# Patient Record
Sex: FEMALE | Race: WHITE | NOT HISPANIC OR LATINO | Employment: FULL TIME | URBAN - METROPOLITAN AREA
[De-identification: names, ages, dates, MRNs, and addresses within clinical notes are randomized per-mention and may not be internally consistent; named-entity substitution may affect disease eponyms.]

---

## 2017-02-03 ENCOUNTER — TRANSCRIBE ORDERS (OUTPATIENT)
Dept: ADMINISTRATIVE | Facility: HOSPITAL | Age: 42
End: 2017-02-03

## 2017-02-03 DIAGNOSIS — Z12.31 VISIT FOR SCREENING MAMMOGRAM: Primary | ICD-10-CM

## 2017-02-28 ENCOUNTER — HOSPITAL ENCOUNTER (OUTPATIENT)
Dept: MAMMOGRAPHY | Facility: HOSPITAL | Age: 42
Discharge: HOME | End: 2017-02-28
Attending: OBSTETRICS & GYNECOLOGY | Admitting: OBSTETRICS & GYNECOLOGY

## 2017-02-28 DIAGNOSIS — Z12.31 VISIT FOR SCREENING MAMMOGRAM: ICD-10-CM

## 2017-02-28 PROCEDURE — 77063 BREAST TOMOSYNTHESIS BI: CPT

## 2017-02-28 PROCEDURE — 77067 SCR MAMMO BI INCL CAD: CPT | Performed by: RADIOLOGY

## 2017-02-28 PROCEDURE — G0202 SCR MAMMO BI INCL CAD: HCPCS

## 2017-02-28 PROCEDURE — 77063 BREAST TOMOSYNTHESIS BI: CPT | Performed by: RADIOLOGY

## 2018-01-02 ENCOUNTER — TRANSCRIBE ORDERS (OUTPATIENT)
Dept: ADMINISTRATIVE | Facility: HOSPITAL | Age: 43
End: 2018-01-02

## 2018-01-02 ENCOUNTER — HOSPITAL ENCOUNTER (OUTPATIENT)
Dept: GENERAL RADIOLOGY | Facility: HOSPITAL | Age: 43
Discharge: HOME | End: 2018-01-02
Attending: INTERNAL MEDICINE | Admitting: INTERNAL MEDICINE

## 2018-01-02 DIAGNOSIS — M54.5 LOW BACK PAIN, UNSPECIFIED BACK PAIN LATERALITY, UNSPECIFIED CHRONICITY, WITH SCIATICA PRESENCE UNSPECIFIED: Primary | ICD-10-CM

## 2018-01-02 DIAGNOSIS — R07.81 RIB PAIN ON RIGHT SIDE: ICD-10-CM

## 2018-01-02 PROCEDURE — 71046 X-RAY EXAM CHEST 2 VIEWS: CPT

## 2018-01-02 PROCEDURE — 72110 X-RAY EXAM L-2 SPINE 4/>VWS: CPT

## 2018-01-17 ENCOUNTER — TRANSCRIBE ORDERS (OUTPATIENT)
Dept: ADMINISTRATIVE | Facility: HOSPITAL | Age: 43
End: 2018-01-17

## 2018-01-17 DIAGNOSIS — G06.1 INTRASPINAL ABSCESS: Primary | ICD-10-CM

## 2018-01-26 ENCOUNTER — HOSPITAL ENCOUNTER (OUTPATIENT)
Dept: CT IMAGING | Facility: HOSPITAL | Age: 43
Discharge: HOME | End: 2018-01-26
Attending: INTERNAL MEDICINE | Admitting: INTERNAL MEDICINE

## 2018-01-26 DIAGNOSIS — G06.1 INTRASPINAL ABSCESS: ICD-10-CM

## 2018-01-26 PROCEDURE — 71250 CT THORAX DX C-: CPT

## 2018-03-22 ENCOUNTER — OFFICE VISIT (OUTPATIENT)
Dept: FAMILY MEDICINE CLINIC | Facility: CLINIC | Age: 43
End: 2018-03-22

## 2018-03-22 VITALS
SYSTOLIC BLOOD PRESSURE: 122 MMHG | TEMPERATURE: 98.1 F | OXYGEN SATURATION: 98 % | DIASTOLIC BLOOD PRESSURE: 76 MMHG | WEIGHT: 124 LBS | BODY MASS INDEX: 21.17 KG/M2 | HEIGHT: 64 IN | HEART RATE: 81 BPM

## 2018-03-22 DIAGNOSIS — M54.6 THORACIC SPINE PAIN: ICD-10-CM

## 2018-03-22 DIAGNOSIS — Q34.9 THORACIC CYST: Primary | ICD-10-CM

## 2018-03-22 PROCEDURE — 99203 OFFICE O/P NEW LOW 30 MIN: CPT | Performed by: NURSE PRACTITIONER

## 2018-03-22 RX ORDER — MULTIVIT-MIN/IRON FUM/FOLIC AC 7.5 MG-4
1 TABLET ORAL DAILY
COMMUNITY

## 2018-03-22 RX ORDER — AZELASTINE HCL 205.5 UG/1
SPRAY NASAL
COMMUNITY
Start: 2018-03-05 | End: 2018-06-04

## 2018-03-22 NOTE — PROGRESS NOTES
"Subjective   Tamara Richardson is a 42 y.o. female here to establish care.  Chief Complaint   Patient presents with   • Establish Care     Previous PCP Dr Mar   • Mass     on back,  low pain       History of Present Illness   Patient is here to establish care, has been a patient of Dr. Mar, is here with complaint of \"knot on back\", patient and her  also noted a more prominent bony area over right upper upper ribs over past few months, had CT which did not show chest abnormality, but incidentally noted a cyst at T3 that was benign appearing, patient is very concerned that she might have cancer because she is finding new \"lumps\" and wants to have testing. Labs were done with Dr. Mar that patient states were normal. Denies any notable weight loss that would have caused nodules to be more prominent.  The following portions of the patient's history were reviewed and updated as appropriate: allergies, current medications, past family history, past medical history, past social history, past surgical history and problem list.    Review of Systems   Constitutional: Positive for fatigue. Negative for appetite change, chills, fever and unexpected weight change.   Eyes: Negative for pain, discharge and visual disturbance.   Respiratory: Negative for cough, shortness of breath and wheezing.    Cardiovascular: Negative for chest pain, palpitations and leg swelling.   Gastrointestinal: Negative for abdominal pain, blood in stool, constipation, diarrhea, nausea and vomiting.   Endocrine: Negative for polydipsia and polyuria.   Genitourinary: Negative for difficulty urinating, dysuria and menstrual problem.   Musculoskeletal: Positive for back pain (thoracic and lumbar spine).   Neurological: Negative for dizziness, light-headedness and headaches.   Psychiatric/Behavioral: Negative for dysphoric mood and sleep disturbance. The patient is not nervous/anxious.      Blood pressure 122/76, pulse 81, temperature 98.1 °F (36.7 " "°C), temperature source Temporal Artery , height 162.6 cm (64\"), weight 56.2 kg (124 lb), last menstrual period 03/18/2018, SpO2 98 %, not currently breastfeeding.    No Known Allergies  History reviewed. No pertinent past medical history.  Past Surgical History:   Procedure Laterality Date   • AUGMENTATION MAMMAPLASTY     • WISDOM TOOTH EXTRACTION       Family History   Problem Relation Age of Onset   • Atrial fibrillation Mother    • Hyperlipidemia Mother    • Cancer Father    • Hyperlipidemia Brother    • Allergy (severe) Daughter    • Breast cancer Neg Hx    • Ovarian cancer Neg Hx      Social History     Social History   • Marital status:      Spouse name: N/A   • Number of children: N/A   • Years of education: N/A     Occupational History   • Not on file.     Social History Main Topics   • Smoking status: Never Smoker   • Smokeless tobacco: Never Used   • Alcohol use Yes      Comment: social   • Drug use: No   • Sexual activity: Yes     Partners: Male     Other Topics Concern   • Not on file     Social History Narrative   • No narrative on file       There is no immunization history on file for this patient.    Current Outpatient Prescriptions:   •  Multiple Vitamins-Minerals (MULTIVITAMIN WITH MINERALS) tablet, Take 1 tablet by mouth Daily., Disp: , Rfl:   •  azelastine (ASTEPRO) 0.15 % solution nasal spray, , Disp: , Rfl:     Objective   Physical Exam   Constitutional: She is oriented to person, place, and time. She appears well-developed and well-nourished. No distress.   HENT:   Head: Normocephalic and atraumatic.   Right Ear: External ear normal.   Left Ear: External ear normal.   Mouth/Throat: Oropharynx is clear and moist. No oropharyngeal exudate.   Eyes: Conjunctivae and EOM are normal. Pupils are equal, round, and reactive to light. No scleral icterus.   Neck: Normal range of motion. Neck supple.   Cardiovascular: Normal rate, regular rhythm and normal heart sounds.    No murmur " heard.  Pulmonary/Chest: Effort normal and breath sounds normal. No respiratory distress. She has no wheezes. She has no rales.   Musculoskeletal:   2.5 cm round slightly mobile cyst-like lesion at upper thoracic spine and just slightly to left of spine, tender to palpation    Neurological: She is alert and oriented to person, place, and time.   Skin: Skin is warm and dry. She is not diaphoretic.   Psychiatric: She has a normal mood and affect. Her behavior is normal. Judgment and thought content normal.   Patient did become tearful a couple of times because of concern for cancer.   Vitals reviewed.      Assessment/Plan   Tamara was seen today for establish care and mass.    Diagnoses and all orders for this visit:    Thoracic cyst  -     MRI Thoracic Spine With & Without Contrast; Future    Thoracic spine pain  -     MRI Thoracic Spine With & Without Contrast; Future      New Medications Ordered This Visit   Medications   • azelastine (ASTEPRO) 0.15 % solution nasal spray   • Multiple Vitamins-Minerals (MULTIVITAMIN WITH MINERALS) tablet     Sig: Take 1 tablet by mouth Daily.       An MRI was ordered to evaluate the cyst noted on CT and that patient feels has become more prominent and now slightly painful or tender. Other options include US of lesion, referral for biopsy if indicated.   I tried to reassure patient, therapeutic listening used.  Patient was encouraged to keep me informed of any acute changes, lack of improvement, or any new concerning symptoms.Patient voiced understanding of all instructions and denied further questions.

## 2018-03-28 ENCOUNTER — HOSPITAL ENCOUNTER (OUTPATIENT)
Dept: MRI IMAGING | Facility: HOSPITAL | Age: 43
Discharge: HOME | End: 2018-03-28
Admitting: NURSE PRACTITIONER

## 2018-03-28 DIAGNOSIS — M54.6 THORACIC SPINE PAIN: ICD-10-CM

## 2018-03-28 DIAGNOSIS — Q34.9 THORACIC CYST: ICD-10-CM

## 2018-03-28 PROCEDURE — 0 GADOBENATE DIMEGLUMINE 529 MG/ML SOLUTION: Performed by: NURSE PRACTITIONER

## 2018-03-28 PROCEDURE — 72157 MRI CHEST SPINE W/O & W/DYE: CPT

## 2018-03-28 PROCEDURE — A9577 INJ MULTIHANCE: HCPCS | Performed by: NURSE PRACTITIONER

## 2018-03-28 RX ADMIN — GADOBENATE DIMEGLUMINE 10 ML: 529 INJECTION, SOLUTION INTRAVENOUS at 10:30

## 2018-05-14 ENCOUNTER — TRANSCRIBE ORDERS (OUTPATIENT)
Dept: ADMINISTRATIVE | Facility: HOSPITAL | Age: 43
End: 2018-05-14

## 2018-05-14 DIAGNOSIS — Z12.31 VISIT FOR SCREENING MAMMOGRAM: Primary | ICD-10-CM

## 2018-06-04 ENCOUNTER — OFFICE VISIT (OUTPATIENT)
Dept: FAMILY MEDICINE CLINIC | Facility: CLINIC | Age: 43
End: 2018-06-04

## 2018-06-04 VITALS
SYSTOLIC BLOOD PRESSURE: 120 MMHG | OXYGEN SATURATION: 98 % | HEART RATE: 82 BPM | BODY MASS INDEX: 21.34 KG/M2 | HEIGHT: 64 IN | DIASTOLIC BLOOD PRESSURE: 70 MMHG | WEIGHT: 125 LBS

## 2018-06-04 DIAGNOSIS — M89.8X9 BONY PROMINENCE: ICD-10-CM

## 2018-06-04 DIAGNOSIS — L72.0 INCLUSION CYST: Primary | ICD-10-CM

## 2018-06-04 PROCEDURE — 99213 OFFICE O/P EST LOW 20 MIN: CPT | Performed by: INTERNAL MEDICINE

## 2018-06-04 NOTE — PROGRESS NOTES
Pleasant 42F new to me here to est care. Seen by Jeanie Tracy NP  3/22/18 and previously a patient of Dr. Mar.  No records received to date.    Wants reassurance on following:  3yrs ago noted prominence at left corner of jaw in front of ear - seen by Dr. Mar and Dr. Ga and told lymph node vs bone and nothing concerning.  U/s done and negative.  No changes over 3yrs, just feels a bit more prominent without pain.    In last year noted prominence of right anterior rib without pain or trauma - had xray done and then CT chest done - nothing significant at this site (rib 3 on right).  Noted inclusion cyst at T3 that was benign appearing, some mild arthritic changes thoracic spine.    In last several months, noted lump at Tspine.  Nontender.  Not growing.  Seen by NP 3/22/18 - described as superficial 2.5cm cyst that was movable.  MRI of tspine done and confirmed cyst at this level.    Today, she states she feels well and has not had any changes to issues above.  Just wanted reassurance.  Denies f/c, malaise, sweats, unwanted wt loss.    Comprehensive ros otherwise negative.    There are no active problems to display for this patient.    Past Surgical History:   Procedure Laterality Date   • AUGMENTATION MAMMAPLASTY      2010   • WISDOM TOOTH EXTRACTION       Current Outpatient Prescriptions   Medication Sig Dispense Refill   • Multiple Vitamins-Minerals (MULTIVITAMIN WITH MINERALS) tablet Take 1 tablet by mouth Daily.       No current facility-administered medications for this visit.      No Known Allergies    Social History     Social History   • Marital status:      Spouse name: Eugenio   • Number of children: 2     Occupational History   • School Psychologist      [employer]     Social History Main Topics   • Smoking status: Never Smoker   • Smokeless tobacco: Never Used   • Alcohol use Yes      Comment: social   • Drug use: No   • Sexual activity: Yes     Partners: Male     Other Topics Concern  "  • Not on file     Social History Narrative    6/17:     to Eugenio.    2 girls:  11,13    Work: school psychologist    Exercise: yes     Family History   Problem Relation Age of Onset   • Atrial fibrillation Mother    • Hyperlipidemia Mother    • Cancer Father         kidney   • Hyperlipidemia Brother    • Allergy (severe) Daughter         treenut   • Breast cancer Neg Hx    • Ovarian cancer Neg Hx        /70   Pulse 82   Ht 162.6 cm (64.02\")   Wt 56.7 kg (125 lb)   SpO2 98%   BMI 21.45 kg/m²   Gen: well appearing in nad, no resp effort  Eyes: conjunctiva clear, perrl, eomi  ENT: mmm, no thyromegaly, no lymphadenopathy  CV: s1, s2 reg no m/r/g, no bruits, no jvd  No peripheral edema, pedal pulses intact  Resp:  clear b/l no w/r/r  GI:  soft nt/nd  Skin: no clubbing or cyanosis  Neuro: no focal deficits.  MS: tspine - t3 level with 2.5cm superficial lump c/w cyst that is movable and nontender.    Chest: prominent right rib at 3rd rib costochondral junction, nontender  Left jaw - angle of jaw more prominent than right, no definite LN, nontender  No cervical or supraclavicular lymphad    All imaging reviewed in epic - ls spine xray, ct chest, MRI thoracic spine  Med recs requested and will be reviewed    A/P    1. Inclusion cyst    2. Bony prominence        Inclusion cyst of tspine - unchanged c/w exam 3mo prior - pt reassured - advised no intervention recommended at this time - f/u 10/18 cpe , will reassess then    Bony prominence of right 3rd rib at costochondral jx  - pt reassured    F/u as needed      "

## 2018-06-19 ENCOUNTER — HOSPITAL ENCOUNTER (OUTPATIENT)
Dept: MAMMOGRAPHY | Facility: HOSPITAL | Age: 43
Discharge: HOME | End: 2018-06-19
Attending: OBSTETRICS & GYNECOLOGY | Admitting: OBSTETRICS & GYNECOLOGY

## 2018-06-19 DIAGNOSIS — Z12.31 VISIT FOR SCREENING MAMMOGRAM: ICD-10-CM

## 2018-06-19 PROCEDURE — 77067 SCR MAMMO BI INCL CAD: CPT | Performed by: RADIOLOGY

## 2018-06-19 PROCEDURE — 77067 SCR MAMMO BI INCL CAD: CPT

## 2018-06-19 PROCEDURE — 77063 BREAST TOMOSYNTHESIS BI: CPT

## 2018-06-19 PROCEDURE — 77063 BREAST TOMOSYNTHESIS BI: CPT | Performed by: RADIOLOGY

## 2018-07-02 ENCOUNTER — TRANSCRIBE ORDERS (OUTPATIENT)
Dept: MAMMOGRAPHY | Facility: HOSPITAL | Age: 43
End: 2018-07-02

## 2018-07-02 ENCOUNTER — HOSPITAL ENCOUNTER (OUTPATIENT)
Dept: MAMMOGRAPHY | Facility: HOSPITAL | Age: 43
Discharge: HOME | End: 2018-07-02
Admitting: OBSTETRICS & GYNECOLOGY

## 2018-07-02 ENCOUNTER — HOSPITAL ENCOUNTER (OUTPATIENT)
Dept: ULTRASOUND IMAGING | Facility: HOSPITAL | Age: 43
Discharge: HOME | End: 2018-07-02

## 2018-07-02 DIAGNOSIS — R92.8 ABNORMAL MAMMOGRAM: ICD-10-CM

## 2018-07-02 DIAGNOSIS — R92.8 ABNORMAL MAMMOGRAM: Primary | ICD-10-CM

## 2018-07-02 PROCEDURE — 77065 DX MAMMO INCL CAD UNI: CPT | Performed by: RADIOLOGY

## 2018-07-02 PROCEDURE — 77061 BREAST TOMOSYNTHESIS UNI: CPT | Performed by: RADIOLOGY

## 2018-07-02 PROCEDURE — G0279 TOMOSYNTHESIS, MAMMO: HCPCS

## 2018-07-02 PROCEDURE — 77065 DX MAMMO INCL CAD UNI: CPT

## 2018-07-02 PROCEDURE — 76642 ULTRASOUND BREAST LIMITED: CPT | Performed by: RADIOLOGY

## 2018-07-02 PROCEDURE — 76642 ULTRASOUND BREAST LIMITED: CPT

## 2018-10-18 ENCOUNTER — OFFICE VISIT (OUTPATIENT)
Dept: FAMILY MEDICINE CLINIC | Facility: CLINIC | Age: 43
End: 2018-10-18

## 2018-10-18 VITALS
BODY MASS INDEX: 20.83 KG/M2 | WEIGHT: 122 LBS | SYSTOLIC BLOOD PRESSURE: 110 MMHG | OXYGEN SATURATION: 97 % | HEART RATE: 77 BPM | HEIGHT: 64 IN | DIASTOLIC BLOOD PRESSURE: 64 MMHG

## 2018-10-18 DIAGNOSIS — Z00.00 ANNUAL PHYSICAL EXAM: Primary | ICD-10-CM

## 2018-10-18 PROBLEM — T78.40XA ALLERGY: Status: ACTIVE | Noted: 2018-10-18

## 2018-10-18 LAB
ALBUMIN SERPL-MCNC: 4.8 G/DL (ref 3.2–4.8)
ALBUMIN/GLOB SERPL: 2 G/DL (ref 1.5–2.5)
ALP SERPL-CCNC: 76 U/L (ref 25–100)
ALT SERPL W P-5'-P-CCNC: 17 U/L (ref 7–40)
ANION GAP SERPL CALCULATED.3IONS-SCNC: 4 MMOL/L (ref 3–11)
ARTICHOKE IGE QN: 102 MG/DL (ref 0–130)
AST SERPL-CCNC: 20 U/L (ref 0–33)
BASOPHILS # BLD AUTO: 0.01 10*3/MM3 (ref 0–0.2)
BASOPHILS NFR BLD AUTO: 0.2 % (ref 0–1)
BILIRUB SERPL-MCNC: 0.8 MG/DL (ref 0.3–1.2)
BUN BLD-MCNC: 7 MG/DL (ref 9–23)
BUN/CREAT SERPL: 12.5 (ref 7–25)
CALCIUM SPEC-SCNC: 9.7 MG/DL (ref 8.7–10.4)
CHLORIDE SERPL-SCNC: 104 MMOL/L (ref 99–109)
CHOLEST SERPL-MCNC: 217 MG/DL (ref 0–200)
CO2 SERPL-SCNC: 28 MMOL/L (ref 20–31)
CREAT BLD-MCNC: 0.56 MG/DL (ref 0.6–1.3)
DEPRECATED RDW RBC AUTO: 43.5 FL (ref 37–54)
EOSINOPHIL # BLD AUTO: 0.08 10*3/MM3 (ref 0–0.3)
EOSINOPHIL NFR BLD AUTO: 1.6 % (ref 0–3)
ERYTHROCYTE [DISTWIDTH] IN BLOOD BY AUTOMATED COUNT: 13.3 % (ref 11.3–14.5)
GFR SERPL CREATININE-BSD FRML MDRD: 118 ML/MIN/1.73
GLOBULIN UR ELPH-MCNC: 2.4 GM/DL
GLUCOSE BLD-MCNC: 89 MG/DL (ref 70–100)
HCT VFR BLD AUTO: 41.6 % (ref 34.5–44)
HDLC SERPL-MCNC: 108 MG/DL (ref 40–60)
HGB BLD-MCNC: 13.6 G/DL (ref 11.5–15.5)
IMM GRANULOCYTES # BLD: 0.01 10*3/MM3 (ref 0–0.03)
IMM GRANULOCYTES NFR BLD: 0.2 % (ref 0–0.6)
LYMPHOCYTES # BLD AUTO: 1.9 10*3/MM3 (ref 0.6–4.8)
LYMPHOCYTES NFR BLD AUTO: 37.2 % (ref 24–44)
MCH RBC QN AUTO: 29.2 PG (ref 27–31)
MCHC RBC AUTO-ENTMCNC: 32.7 G/DL (ref 32–36)
MCV RBC AUTO: 89.5 FL (ref 80–99)
MONOCYTES # BLD AUTO: 0.45 10*3/MM3 (ref 0–1)
MONOCYTES NFR BLD AUTO: 8.8 % (ref 0–12)
NEUTROPHILS # BLD AUTO: 2.66 10*3/MM3 (ref 1.5–8.3)
NEUTROPHILS NFR BLD AUTO: 52 % (ref 41–71)
PLATELET # BLD AUTO: 320 10*3/MM3 (ref 150–450)
PMV BLD AUTO: 10.4 FL (ref 6–12)
POTASSIUM BLD-SCNC: 4.1 MMOL/L (ref 3.5–5.5)
PROT SERPL-MCNC: 7.2 G/DL (ref 5.7–8.2)
RBC # BLD AUTO: 4.65 10*6/MM3 (ref 3.89–5.14)
SODIUM BLD-SCNC: 136 MMOL/L (ref 132–146)
TRIGL SERPL-MCNC: 61 MG/DL (ref 0–150)
TSH SERPL DL<=0.05 MIU/L-ACNC: 0.95 MIU/ML (ref 0.35–5.35)
WBC NRBC COR # BLD: 5.11 10*3/MM3 (ref 3.5–10.8)

## 2018-10-18 PROCEDURE — 80061 LIPID PANEL: CPT | Performed by: INTERNAL MEDICINE

## 2018-10-18 PROCEDURE — 99396 PREV VISIT EST AGE 40-64: CPT | Performed by: INTERNAL MEDICINE

## 2018-10-18 PROCEDURE — 80053 COMPREHEN METABOLIC PANEL: CPT | Performed by: INTERNAL MEDICINE

## 2018-10-18 PROCEDURE — 84443 ASSAY THYROID STIM HORMONE: CPT | Performed by: INTERNAL MEDICINE

## 2018-10-18 PROCEDURE — 36415 COLL VENOUS BLD VENIPUNCTURE: CPT | Performed by: INTERNAL MEDICINE

## 2018-10-18 PROCEDURE — 85025 COMPLETE CBC W/AUTO DIFF WBC: CPT | Performed by: INTERNAL MEDICINE

## 2018-10-18 NOTE — PROGRESS NOTES
Reason for Visit   This is a 43 yr old patient who presents for a complete physical exam.  Chief Complaint   Patient offers no new complaints.     History of Present Illness   Here for CPE.    ý   Review of Systems      General: no fever, chills, weight loss, or fatigue  Eyes: no blurry vision or change in vision  ENT: no change in hearing, difficulty hearing, rhinorrhea or nasal discharge  CV: no cp, sob, palpitations; no PND, orthopnea, le edema; no ledesma   Respiratory: no cough, wheezes, sob  GI: no change in bowel habits, no n/v/d/c, no melena, no blood in stool   : no frequency, no urgency, no dysuria, no vaginal discharge, no pelvic pain  MS: no joint pains, back pain, or myalgias  Neuro: no headache, dizziness, or weakness; no new parenthesis.  Endocrine: no polyuria, polydipsia or polyphagia; no heat or cold intolerance  Heme: no easy bruising or bleeding   Skin: no rashes or abnormal moles     Remainder of ROS reviewed in detail and found to be negative and noncontributory.     OBGYN History    Menses regular  LMP: ?  Sexually Active: Y  Contraception:   Abnormal pap smear:  No history of abnormal pap, est with gyn, utd  STD: no history of STD      Patient Active Problem List   Diagnosis   • Allergy       Past Surgical History:   Procedure Laterality Date   • AUGMENTATION MAMMAPLASTY         • WISDOM TOOTH EXTRACTION         Current Outpatient Prescriptions   Medication Sig Dispense Refill   • Loratadine (CLARITIN PO) Take  by mouth.     • Multiple Vitamins-Minerals (MULTIVITAMIN WITH MINERALS) tablet Take 1 tablet by mouth Daily.       No current facility-administered medications for this visit.        No Known Allergies    Family History   Family History   Problem Relation Age of Onset   • Atrial fibrillation Mother    • Hyperlipidemia Mother    • Cancer Father         kidney   • Hyperlipidemia Brother    • Allergy (severe) Daughter         treenut   • Breast cancer Neg Hx    • Ovarian cancer Neg  "Hx        Social History   Social History     Social History   • Marital status:      Spouse name: Eugenio   • Number of children: 2     Occupational History   • School Psychologist      [employer]     Social History Main Topics   • Smoking status: Never Smoker   • Smokeless tobacco: Never Used   • Alcohol use Yes      Comment: social   • Drug use: No   • Sexual activity: Yes     Partners: Male     Other Topics Concern   • Not on file     Social History Narrative    6/17:     to Eugenio.    2 girls:  11,13    Work: school psychologist    Exercise: yes        10/18:    No changes.    Eye: no corrective lenses - h/o lasik    Dental: utd                  There is no immunization history on file for this patient.    Health Maintenance  Mammo 7/18 utd  Pap 2018 per pt, utd    Blood pressure 110/64, pulse 77, height 162.6 cm (64.02\"), weight 55.3 kg (122 lb), SpO2 97 %, not currently breastfeeding.  Body mass index is 20.93 kg/m².  1    10/18/18  0825   Weight: 55.3 kg (122 lb)         Physical Exam   Gen: Pleasant, NAD  HEENT: perrl, eomi, tm wnl b/l, canals clear, conjuntiva clear b/l, mmm, op clear  Neck: supple, no lad or thyromegaly/nodules, no bruit , no jvd  Pulm: cta b/l, no w/r/r  CV: S1, S2 reg, no mrg, no s3/s4  Abd: soft, nt, nd, + bs, no hsm  Ext: no c/c/e, distal pulses intact  Neuro: AAO x 3, no focal motor or sensory deficits, normal gait   Skin: no rashes, no suspicious nevi   Superficial cyst of upper back lying over aera around t1-2 - 1.2 x 1.5 cm movable    Assessment and Plan    43F for CPE  Discussion and Plan:  General health screening appropriate for age reviewed  General nutrition recommendations reviewed  Calcium with Vitamin D recommendations reviewed -  Exercise recommendations reviewed  Healthy weight guidelines reviewed  Yearly breast exam recommended  Self breast exam reviewed and recommended monthly  Mammography discussed, pt utd  Annual physical exam recommended  Labs " ordered today  Imm - tdap, flu shots due, pt could probably benefit from hep a series as well     Diagnosis Plan   1. Annual physical exam  Lipid panel    TSH    Comprehensive metabolic panel    CBC w AUTO Differential     x

## 2018-11-02 ENCOUNTER — TELEPHONE (OUTPATIENT)
Dept: FAMILY MEDICINE CLINIC | Facility: CLINIC | Age: 43
End: 2018-11-02

## 2018-11-02 NOTE — TELEPHONE ENCOUNTER
PT CALLED ABOUT HER LABS AND HAD A QUESTION. PT SAID HER BUN LEVEL WAS LOW AND WANTS TO KNOW IF THAT OK.  PLEASE CALL PT BACK [phone] PT SAID IF SHE DOESN'T ANSWER TO JUST LET HER KNOW ON THE VOICEMAIL

## 2018-11-05 NOTE — TELEPHONE ENCOUNTER
LVM explaining to not be worried about the BUN level and may increase protein in diet. Gave office # for any further questions.

## 2018-11-05 NOTE — TELEPHONE ENCOUNTER
Not at all worried about low BUN level.  Advise she make sure she has enough protein in diet - sometimes will go low with lower protein diet.    Meghna

## 2019-01-07 ENCOUNTER — HOSPITAL ENCOUNTER (OUTPATIENT)
Dept: MAMMOGRAPHY | Facility: HOSPITAL | Age: 44
Discharge: HOME | End: 2019-01-07
Attending: OBSTETRICS & GYNECOLOGY | Admitting: OBSTETRICS & GYNECOLOGY

## 2019-01-07 DIAGNOSIS — R92.8 ABNORMAL MAMMOGRAM: ICD-10-CM

## 2019-01-07 PROCEDURE — 77065 DX MAMMO INCL CAD UNI: CPT | Performed by: RADIOLOGY

## 2019-01-07 PROCEDURE — 77061 BREAST TOMOSYNTHESIS UNI: CPT | Performed by: RADIOLOGY

## 2019-01-07 PROCEDURE — G0279 TOMOSYNTHESIS, MAMMO: HCPCS

## 2019-01-07 PROCEDURE — 77065 DX MAMMO INCL CAD UNI: CPT

## 2019-02-23 ENCOUNTER — OFFICE VISIT (OUTPATIENT)
Dept: FAMILY MEDICINE CLINIC | Facility: CLINIC | Age: 44
End: 2019-02-23

## 2019-02-23 VITALS
HEIGHT: 64 IN | DIASTOLIC BLOOD PRESSURE: 70 MMHG | WEIGHT: 130 LBS | SYSTOLIC BLOOD PRESSURE: 118 MMHG | BODY MASS INDEX: 22.2 KG/M2 | HEART RATE: 84 BPM | RESPIRATION RATE: 24 BRPM

## 2019-02-23 DIAGNOSIS — F41.9 ANXIETY: Primary | ICD-10-CM

## 2019-02-23 DIAGNOSIS — Z56.6 WORK-RELATED STRESS: ICD-10-CM

## 2019-02-23 PROCEDURE — 99213 OFFICE O/P EST LOW 20 MIN: CPT | Performed by: INTERNAL MEDICINE

## 2019-02-23 RX ORDER — ESCITALOPRAM OXALATE 10 MG/1
10 TABLET ORAL DAILY
Qty: 30 TABLET | Refills: 2 | Status: SHIPPED | OUTPATIENT
Start: 2019-02-23 | End: 2019-05-30 | Stop reason: SDUPTHER

## 2019-02-23 RX ORDER — ALPRAZOLAM 0.25 MG/1
0.25 TABLET ORAL NIGHTLY PRN
Qty: 15 TABLET | Refills: 0 | Status: SHIPPED | OUTPATIENT
Start: 2019-02-23 | End: 2019-11-09

## 2019-02-23 SDOH — HEALTH STABILITY - MENTAL HEALTH: OTHER PHYSICAL AND MENTAL STRAIN RELATED TO WORK: Z56.6

## 2019-02-23 NOTE — PROGRESS NOTES
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  She has decided to take profressional route and stick it out until the end of school year and is confident she can do this for another 12 weeks.  She has a very supportive and loving home environment with / family and [employer].  Her sleep is suffering and she is feeling queeziness at times related to stress.  Denies h/a, dizziness, cp, palpitations, sob, sweats, vomiting.  No depressed mood, SI.  She is here requesting some help with temporary med mgmt.    The following portions of the patient's history were reviewed and updated as appropriate: allergies, current medications, past family history, past medical history, past social history, past surgical history and problem list.      /70   Pulse 84   Resp 24   Ht 162.6 cm (64\")   Wt 59 kg (130 lb)   BMI 22.31 kg/m²   Gen: well appearing in nad, no resp effort  CV: s1, s2 reg no m/r/g, no bruits, no jvd  No peripheral edema, pedal pulses intact  Resp:  clear b/l no w/r/r  GI:  soft nt/nd  Neuro: no focal deficits.    A/P    1. Anxiety    2. Work-related stress      Pt with work related stress that she is overall managing quite well with good support system in place.  Discussed exercise, eating healthy, and certainly is important for her to sleep adequately.  Suggest: initiate lexapro 10mg daily with hopes it will help with all sx within 2-3wks time.  PRN xanax for sleep given .  Reviewed both meds, risks and benefits, side effect profiles.    Plan of care reviewed with patient at the conclusion of today's visit. Education was provided regarding diagnosis, management and any prescribed or recommended OTC medications.  Patient verbalizes understanding of and agreement with management plan.    "

## 2019-02-23 NOTE — PATIENT INSTRUCTIONS
Alprazolam tablets  What is this medicine?  ALPRAZOLAM (al PRAY lety medrano) is a benzodiazepine. It is used to treat anxiety and panic attacks.  This medicine may be used for other purposes; ask your health care provider or pharmacist if you have questions.  COMMON BRAND NAME(S): Xanax  What should I tell my health care provider before I take this medicine?  They need to know if you have any of these conditions:  -an alcohol or drug abuse problem  -bipolar disorder, depression, psychosis or other mental health conditions  -glaucoma  -kidney or liver disease  -lung or breathing disease  -myasthenia gravis  -Parkinson's disease  -porphyria  -seizures or a history of seizures  -suicidal thoughts  -an unusual or allergic reaction to alprazolam, other benzodiazepines, foods, dyes, or preservatives  -pregnant or trying to get pregnant  -breast-feeding  How should I use this medicine?  Take this medicine by mouth with a glass of water. Follow the directions on the prescription label. Take your medicine at regular intervals. Do not take it more often than directed. Do not stop taking except on your doctor's advice.  A special MedGuide will be given to you by the pharmacist with each prescription and refill. Be sure to read this information carefully each time.  Talk to your pediatrician regarding the use of this medicine in children. Special care may be needed.  Overdosage: If you think you have taken too much of this medicine contact a poison control center or emergency room at once.  NOTE: This medicine is only for you. Do not share this medicine with others.  What if I miss a dose?  If you miss a dose, take it as soon as you can. If it is almost time for your next dose, take only that dose. Do not take double or extra doses.  What may interact with this medicine?  Do not take this medicine with any of the following medications:  -certain antiviral medicines for HIV or AIDS like delavirdine, indinavir  -certain medicines for  fungal infections like ketoconazole and itraconazole  -narcotic medicines for cough  -sodium oxybate  This medicine may also interact with the following medications:  -alcohol  -antihistamines for allergy, cough and cold  -certain antibiotics like clarithromycin, erythromycin, isoniazid, rifampin, rifapentine, rifabutin, and troleandomycin  -certain medicines for blood pressure, heart disease, irregular heart beat  -certain medicines for depression, like amitriptyline, fluoxetine, sertraline  -certain medicines for seizures like carbamazepine, oxcarbazepine, phenobarbital, phenytoin, primidone  -cimetidine  -cyclosporine  -female hormones, like estrogens or progestins and birth control pills, patches, rings, or injections  -general anesthetics like halothane, isoflurane, methoxyflurane, propofol  -grapefruit juice  -local anesthetics like lidocaine, pramoxine, tetracaine  -medicines that relax muscles for surgery  -narcotic medicines for pain  -other antiviral medicines for HIV or AIDS  -phenothiazines like chlorpromazine, mesoridazine, prochlorperazine, thioridazine  This list may not describe all possible interactions. Give your health care provider a list of all the medicines, herbs, non-prescription drugs, or dietary supplements you use. Also tell them if you smoke, drink alcohol, or use illegal drugs. Some items may interact with your medicine.  What should I watch for while using this medicine?  Tell your doctor or health care professional if your symptoms do not start to get better or if they get worse.  Do not stop taking except on your doctor's advice. You may develop a severe reaction. Your doctor will tell you how much medicine to take.  You may get drowsy or dizzy. Do not drive, use machinery, or do anything that needs mental alertness until you know how this medicine affects you. To reduce the risk of dizzy and fainting spells, do not stand or sit up quickly, especially if you are an older patient.  Alcohol may increase dizziness and drowsiness. Avoid alcoholic drinks.  If you are taking another medicine that also causes drowsiness, you may have more side effects. Give your health care provider a list of all medicines you use. Your doctor will tell you how much medicine to take. Do not take more medicine than directed. Call emergency for help if you have problems breathing or unusual sleepiness.  What side effects may I notice from receiving this medicine?  Side effects that you should report to your doctor or health care professional as soon as possible:  -allergic reactions like skin rash, itching or hives, swelling of the face, lips, or tongue  -breathing problems  -confusion  -loss of balance or coordination  -signs and symptoms of low blood pressure like dizziness; feeling faint or lightheaded, falls; unusually weak or tired  -suicidal thoughts or other mood changes  Side effects that usually do not require medical attention (report to your doctor or health care professional if they continue or are bothersome):  -dizziness  -dry mouth  -nausea, vomiting  -tiredness  This list may not describe all possible side effects. Call your doctor for medical advice about side effects. You may report side effects to FDA at 5-721-FDA-5908.  Where should I keep my medicine?  Keep out of the reach of children. This medicine can be abused. Keep your medicine in a safe place to protect it from theft. Do not share this medicine with anyone. Selling or giving away this medicine is dangerous and against the law.  Store at room temperature between 20 and 25 degrees C (68 and 77 degrees F). This medicine may cause accidental overdose and death if taken by other adults, children, or pets. Mix any unused medicine with a substance like cat litter or coffee grounds. Then throw the medicine away in a sealed container like a sealed bag or a coffee can with a lid. Do not use the medicine after the expiration date.  NOTE: This sheet is  a summary. It may not cover all possible information. If you have questions about this medicine, talk to your doctor, pharmacist, or health care provider.  © 2018 [employer] (2016-09-15 13:47:25)      Escitalopram oral solution  What is this medicine?  ESCITALOPRAM (es sye PETER oh pram) is used to treat depression and certain types of anxiety.  This medicine may be used for other purposes; ask your health care provider or pharmacist if you have questions.  COMMON BRAND NAME(S): Lexapro  What should I tell my health care provider before I take this medicine?  They need to know if you have any of these conditions:  -bipolar disorder or a family history of bipolar disorder  -diabetes  -glaucoma  -heart disease  -kidney or liver disease  -receiving electroconvulsive therapy  -seizures (convulsions)  -suicidal thoughts, plans, or attempt by you or a family member  -an unusual or allergic reaction to escitalopram, citalopram, other medicines, foods, dyes, or preservatives  -pregnant or trying to become pregnant  -breast-feeding  How should I use this medicine?  Take this medicine by mouth. Follow the directions on the prescription label. Use a specially marked spoon or container to measure your medicine. Ask your pharmacist if you do not have one. Household spoons are not accurate. This medicine can be taken with or without food. Take your medicine at regular intervals. Do not take it more often than directed. Do not stop taking this medicine suddenly except upon the advice of your doctor. Stopping this medicine too quickly may cause serious side effects or your condition may worsen.  A special MedGuide will be given to you by the pharmacist with each prescription and refill. Be sure to read this information carefully each time.  Talk to your pediatrician regarding the use of this medicine in children. Special care may be needed.  Overdosage: If you think you have taken too much of this medicine contact a  poison control center or emergency room at once.  NOTE: This medicine is only for you. Do not share this medicine with others.  What if I miss a dose?  If you miss a dose, take it as soon as you can. If it is almost time for your next dose, take only that dose. Do not take double or extra doses.  What may interact with this medicine?  Do not take this medicine with any of the following medications:  -certain medicines for fungal infections like fluconazole, itraconazole, ketoconazole, posaconazole, voriconazole  -cisapride  -citalopram  -dofetilide  -dronedarone  -linezolid  -MAOIs like Carbex, Eldepryl, Marplan, Nardil, and Parnate  -methylene blue (injected into a vein)  -pimozide  -thioridazine  -ziprasidone  This medicine may also interact with the following medications:  -alcohol  -amphetamines  -aspirin and aspirin-like medicines  -carbamazepine  -certain medicines for depression, anxiety, or psychotic disturbances  -certain medicines for migraine headache like almotriptan, eletriptan, frovatriptan, naratriptan, rizatriptan, sumatriptan, zolmitriptan  -certain medicines for sleep  -certain medicines that treat or prevent blood clots like warfarin, enoxaparin, and dalteparin  -cimetidine  -diuretics  -fentanyl  -furazolidone  -isoniazid  -lithium  -metoprolol  -NSAIDs, medicines for pain and inflammation, like ibuprofen or naproxen  -other medicines that prolong the QT interval (cause an abnormal heart rhythm)  -procarbazine  -rasagiline  -supplements like Maryam's wort, kava kava, valerian  -tramadol  -tryptophan  This list may not describe all possible interactions. Give your health care provider a list of all the medicines, herbs, non-prescription drugs, or dietary supplements you use. Also tell them if you smoke, drink alcohol, or use illegal drugs. Some items may interact with your medicine.  What should I watch for while using this medicine?  Tell your doctor if your symptoms do not get better or if  they get worse. Visit your doctor or health care professional for regular checks on your progress. Because it may take several weeks to see the full effects of this medicine, it is important to continue your treatment as prescribed by your doctor.  Patients and their families should watch out for new or worsening thoughts of suicide or depression. Also watch out for sudden changes in feelings such as feeling anxious, agitated, panicky, irritable, hostile, aggressive, impulsive, severely restless, overly excited and hyperactive, or not being able to sleep. If this happens, especially at the beginning of treatment or after a change in dose, call your health care professional.  You may get drowsy or dizzy. Do not drive, use machinery, or do anything that needs mental alertness until you know how this medicine affects you. Do not stand or sit up quickly, especially if you are an older patient. This reduces the risk of dizzy or fainting spells. Alcohol may interfere with the effect of this medicine. Avoid alcoholic drinks.  Your mouth may get dry. Chewing sugarless gum or sucking hard candy, and drinking plenty of water may help. Contact your doctor if the problem does not go away or is severe.  What side effects may I notice from receiving this medicine?  Side effects that you should report to your doctor or health care professional as soon as possible:  -allergic reactions like skin rash, itching or hives, swelling of the face, lips, or tongue  -anxious  -black, tarry stools  -changes in vision  -confusion  -elevated mood, decreased need for sleep, racing thoughts, impulsive behavior  -eye pain  -fast, irregular heartbeat  -feeling faint or lightheaded, falls  -feeling agitated, angry, or irritable  -hallucination, loss of contact with reality  -loss of balance or coordination  -loss of memory  -restlessness, pacing, inability to keep still  -seizures  -stiff muscles  -suicidal thoughts or other mood changes  -trouble  sleeping  -unusual bleeding or bruising  -unusually weak or tired  -vomiting  Side effects that usually do not require medical attention (report to your doctor or health care professional if they continue or are bothersome):  -changes in appetite  -change in sex drive or performance  -headache  -increased sweating  -indigestion, nausea  -tremors  Ths list may not describe all possible side effects. Call your doctor for medical advice about side effects. You may report side effects to FDA at 1-463-FDA-9626.  This list may not describe all possible side effects. Call your doctor for medical advice about side effects. You may report side effects to FDA at 1800-FDA-1080.  Where should I keep my medicine?  Keep out of reach of children.  Store at room temperature between 15 and 30 degrees C (59 and 86 degrees F). Throw away any unused medicine after the expiration date.  NOTE: This sheet is a summary. It may not cover all possible information. If you have questions about this medicine, talk to your doctor, pharmacist, or health care provider.  © 2018 [employer] (2017-05-20 15:26:08)

## 2019-05-30 RX ORDER — ESCITALOPRAM OXALATE 10 MG/1
10 TABLET ORAL DAILY
Qty: 30 TABLET | Refills: 1 | Status: SHIPPED | OUTPATIENT
Start: 2019-05-30 | End: 2019-11-09

## 2019-08-29 ENCOUNTER — TRANSCRIBE ORDERS (OUTPATIENT)
Dept: ADMINISTRATIVE | Facility: HOSPITAL | Age: 44
End: 2019-08-29

## 2019-08-29 DIAGNOSIS — Z12.31 VISIT FOR SCREENING MAMMOGRAM: Primary | ICD-10-CM

## 2019-10-31 ENCOUNTER — HOSPITAL ENCOUNTER (OUTPATIENT)
Dept: MAMMOGRAPHY | Facility: HOSPITAL | Age: 44
Discharge: HOME | End: 2019-10-31
Admitting: OBSTETRICS & GYNECOLOGY

## 2019-10-31 DIAGNOSIS — Z12.31 VISIT FOR SCREENING MAMMOGRAM: ICD-10-CM

## 2019-10-31 PROCEDURE — 77067 SCR MAMMO BI INCL CAD: CPT

## 2019-10-31 PROCEDURE — 77063 BREAST TOMOSYNTHESIS BI: CPT | Performed by: RADIOLOGY

## 2019-10-31 PROCEDURE — 77067 SCR MAMMO BI INCL CAD: CPT | Performed by: RADIOLOGY

## 2019-10-31 PROCEDURE — 77063 BREAST TOMOSYNTHESIS BI: CPT

## 2019-11-09 ENCOUNTER — OFFICE VISIT (OUTPATIENT)
Dept: FAMILY MEDICINE CLINIC | Facility: CLINIC | Age: 44
End: 2019-11-09

## 2019-11-09 VITALS
RESPIRATION RATE: 20 BRPM | TEMPERATURE: 98.2 F | BODY MASS INDEX: 21.68 KG/M2 | DIASTOLIC BLOOD PRESSURE: 86 MMHG | OXYGEN SATURATION: 99 % | WEIGHT: 127 LBS | HEIGHT: 64 IN | HEART RATE: 88 BPM | SYSTOLIC BLOOD PRESSURE: 112 MMHG

## 2019-11-09 DIAGNOSIS — E78.00 PURE HYPERCHOLESTEROLEMIA: ICD-10-CM

## 2019-11-09 DIAGNOSIS — Z00.00 WELL ADULT EXAM: Primary | ICD-10-CM

## 2019-11-09 DIAGNOSIS — Z23 NEED FOR VACCINATION: ICD-10-CM

## 2019-11-09 DIAGNOSIS — E55.9 VITAMIN D DEFICIENCY: ICD-10-CM

## 2019-11-09 PROCEDURE — 99396 PREV VISIT EST AGE 40-64: CPT | Performed by: FAMILY MEDICINE

## 2019-11-09 NOTE — PROGRESS NOTES
Tamara Richardson presents today for a physical    Chief Complaint   Patient presents with   • Annual Exam   Transferring care from another physician.    HPI     Last saw Gyn last month. Getting ready to have myosure to removed 4 fibroid and polyp. Had labs to check for anemia.     Diet is regular. She eats a lot of nuts. Once a week red meat.     Physical activity includes walking dog 5 days a week and spinning 2 days a week.     No sleep problems. Wakes up to urinate, she drinks water through the night.       PHQ-2/PHQ-9 Depression Screening 2019   Little interest or pleasure in doing things 0   Feeling down, depressed, or hopeless 0   Trouble falling or staying asleep, or sleeping too much 0   Feeling tired or having little energy 0   Poor appetite or overeating 0   Feeling bad about yourself - or that you are a failure or have let yourself or your family down 0   Trouble concentrating on things, such as reading the newspaper or watching television 0   Moving or speaking so slowly that other people could have noticed. Or the opposite - being so fidgety or restless that you have been moving around a lot more than usual 0   Thoughts that you would be better off dead, or of hurting yourself in some way 0   Total Score 0     ABBEY-7  Over the last two weeks, how often have you been bothered by the following problems?  Feeling nervous, anxious or on edge: 1  Not being able to stop or control worryin  Worrying too much about different things: 0  Trouble Relaxin  Being so restless that it is hard to sit still: 0  Becoming easily annoyed or irritable: 1  Feeling afraid as if something awful might happen: 0  ABBEY 7 Total Score: 3  If you checked any problems, how difficult have these problems made it for you to do your work, take care of things at home, or get along with other people: Not difficult at all    Spring 2015 bone protrudes from left side of ear. Saw PA and told lymph node. Had ENT Dr. Ga and  told bones were not symmetrical. Had ultrasound done. Winter Jan 2018 chest bone protrudes, worried about bone cancer and had CT scan and blood work, told bowed bone. Spring 2018 big bump on upper back cyst, MRI was told it was cyst. Summer 2018 cyst on left breast, had monitoring for 6 months. Now has fibroids and a polyp.     She doesn't get flu vaccines. She thinks she had hepatitis shots in graduate school.         Review of Systems   Constitutional: Negative for appetite change and fatigue.   Genitourinary: Positive for menstrual problem.   Hematological: Positive for adenopathy.   Psychiatric/Behavioral: Negative for decreased concentration, dysphoric mood, sleep disturbance, suicidal ideas and depressed mood. The patient is nervous/anxious.         Health Maintenance   Topic Date Due   • TDAP/TD VACCINES (1 - Tdap) 06/10/1993   • PAP SMEAR  01/01/2019   • INFLUENZA VACCINE  08/01/2019   • LIPID PANEL  11/09/2019       Past Medical History:   Diagnosis Date   • Hyperlipidemia         Past Surgical History:   Procedure Laterality Date   • AUGMENTATION MAMMAPLASTY      2010   • REFRACTIVE SURGERY     • WISDOM TOOTH EXTRACTION          Family History   Problem Relation Age of Onset   • Atrial fibrillation Mother    • Hyperlipidemia Mother    • Cancer Father         kidney   • Hyperlipidemia Brother    • Allergy (severe) Daughter         treenut   • Arthritis Maternal Grandmother    • Breast cancer Neg Hx    • Ovarian cancer Neg Hx    • Diabetes Neg Hx         Social History     Socioeconomic History   • Marital status:      Spouse name: Eugenio   • Number of children: 2   • Years of education: Not on file   • Highest education level: Not on file   Occupational History   • Occupation: School Psychologist     Comment: [employer]   Tobacco Use   • Smoking status: Never Smoker   • Smokeless tobacco: Never Used   Substance and Sexual Activity   • Alcohol use: Yes     Comment: social   • Drug use: No   •  "Sexual activity: Yes     Partners: Male   Social History Narrative            Current Outpatient Medications on File Prior to Visit   Medication Sig Dispense Refill   • Multiple Vitamins-Minerals (MULTIVITAMIN WITH MINERALS) tablet Take 1 tablet by mouth Daily.     • [DISCONTINUED] ALPRAZolam (XANAX) 0.25 MG tablet Take 1 tablet by mouth At Night As Needed for Anxiety. 15 tablet 0   • [DISCONTINUED] escitalopram (LEXAPRO) 10 MG tablet Take 1 tablet by mouth Daily. 30 tablet 1     No current facility-administered medications on file prior to visit.        No Known Allergies     Visit Vitals  /86   Pulse 88   Temp 98.2 °F (36.8 °C) (Temporal)   Resp 20   Ht 162.6 cm (64\")   Wt 57.6 kg (127 lb)   LMP 10/31/2019   SpO2 99%   BMI 21.80 kg/m²        Physical Exam   Constitutional: She is oriented to person, place, and time. No distress.   HENT:   Nose: Nose normal.   Mouth/Throat: Oropharynx is clear and moist.   Left mastoid prominence.   Eyes: Conjunctivae are normal. Pupils are equal, round, and reactive to light.   Neck: Neck supple. No thyromegaly present.   Cardiovascular: Normal rate and regular rhythm.   No murmur heard.  Pulses:       Posterior tibial pulses are 2+ on the right side, and 2+ on the left side.   Pulmonary/Chest: Effort normal and breath sounds normal.   Abdominal: Soft. There is no hepatosplenomegaly. There is no tenderness.   Musculoskeletal: She exhibits no edema.   Prominence of right clavicular head.   Lymphadenopathy:        Head (right side): No submandibular, no preauricular and no posterior auricular adenopathy present.        Head (left side): No submandibular, no preauricular and no posterior auricular adenopathy present.     She has no cervical adenopathy.   Neurological: She is alert and oriented to person, place, and time.   Skin: Skin is warm and dry. No rash noted.   Midline upper thoracic soft tissue cyst vs lipoma, non-tender, mobile w/o erythema   Psychiatric: She has a " normal mood and affect. Her behavior is normal. Judgment and thought content normal.   Vitals reviewed.       Results for orders placed or performed in visit on 10/18/18   Lipid panel   Result Value Ref Range    Total Cholesterol 217 (H) 0 - 200 mg/dL    Triglycerides 61 0 - 150 mg/dL    HDL Cholesterol 108 (H) 40 - 60 mg/dL    LDL Cholesterol  102 0 - 130 mg/dL   TSH   Result Value Ref Range    TSH 0.947 0.350 - 5.350 mIU/mL   Comprehensive metabolic panel   Result Value Ref Range    Glucose 89 70 - 100 mg/dL    BUN 7 (L) 9 - 23 mg/dL    Creatinine 0.56 (L) 0.60 - 1.30 mg/dL    Sodium 136 132 - 146 mmol/L    Potassium 4.1 3.5 - 5.5 mmol/L    Chloride 104 99 - 109 mmol/L    CO2 28.0 20.0 - 31.0 mmol/L    Calcium 9.7 8.7 - 10.4 mg/dL    Total Protein 7.2 5.7 - 8.2 g/dL    Albumin 4.80 3.20 - 4.80 g/dL    ALT (SGPT) 17 7 - 40 U/L    AST (SGOT) 20 0 - 33 U/L    Alkaline Phosphatase 76 25 - 100 U/L    Total Bilirubin 0.8 0.3 - 1.2 mg/dL    eGFR Non African Amer 118 >60 mL/min/1.73    Globulin 2.4 gm/dL    A/G Ratio 2.0 1.5 - 2.5 g/dL    BUN/Creatinine Ratio 12.5 7.0 - 25.0    Anion Gap 4.0 3.0 - 11.0 mmol/L   CBC Auto Differential   Result Value Ref Range    WBC 5.11 3.50 - 10.80 10*3/mm3    RBC 4.65 3.89 - 5.14 10*6/mm3    Hemoglobin 13.6 11.5 - 15.5 g/dL    Hematocrit 41.6 34.5 - 44.0 %    MCV 89.5 80.0 - 99.0 fL    MCH 29.2 27.0 - 31.0 pg    MCHC 32.7 32.0 - 36.0 g/dL    RDW 13.3 11.3 - 14.5 %    RDW-SD 43.5 37.0 - 54.0 fl    MPV 10.4 6.0 - 12.0 fL    Platelets 320 150 - 450 10*3/mm3    Neutrophil % 52.0 41.0 - 71.0 %    Lymphocyte % 37.2 24.0 - 44.0 %    Monocyte % 8.8 0.0 - 12.0 %    Eosinophil % 1.6 0.0 - 3.0 %    Basophil % 0.2 0.0 - 1.0 %    Immature Grans % 0.2 0.0 - 0.6 %    Neutrophils, Absolute 2.66 1.50 - 8.30 10*3/mm3    Lymphocytes, Absolute 1.90 0.60 - 4.80 10*3/mm3    Monocytes, Absolute 0.45 0.00 - 1.00 10*3/mm3    Eosinophils, Absolute 0.08 0.00 - 0.30 10*3/mm3    Basophils, Absolute 0.01 0.00 - 0.20  10*3/mm3    Immature Grans, Absolute 0.01 0.00 - 0.03 10*3/mm3      ROUTINE DIGITAL SCREENING MAMMOGRAM WITH TOMOSYNTHESIS     HISTORY: Routine screening.  History of bilateral augmentation  mammoplasty.     IMAGE COMPARISON:  1/7/2019, 7/2/2018, 6/19/2018, 2/28/2017.     TECHNIQUE:  Low dose full field digital breast tomosynthesis imaging was  performed with 2D and 3D acquisitions consisting of bilateral CC and MLO  views. Both standard and implant displaced views were performed. In  addition, bilateral implant displaced exaggerated lateral CC views were  performed.     FINDINGS: There are scattered areas of fibroglandular density. The  fibroglandular pattern appears stable.  There is no mass, worrisome  microcalcifications, or architectural distortion to suggest development  of malignancy. There are bilateral retropectoral saline implants.     IMPRESSION:  No findings suspicious for malignancy.      ACR BI-RADS CATEGORY:  1, NEGATIVE     RECOMMENDATION: Yearly mammogram, yearly clinical breast exam, and  encourage self breast awareness.     CAD was used.     The standard false negative rate of mammography is between 10% and 25%.   Complex patterns or increased breast density will markedly elevate the  false negative rate of mammography.     A letter, in lay terminology, with the results of this exam will be  mailed to the patient.       If there is a palpable area of concern, biopsy should be considered  regardless of imaging findings.           This report was finalized on 11/1/2019 11:57 AM by Dr. Lorna Floyd MD.    EXAMINATION:  LEFT DIAGNOSTIC DIGITAL MAMMOGRAM WITH TOMOSYNTHESIS:       HISTORY: Continued surveillance for an asymmetry that did not appear to  persist on additional views.     TECHNIQUE:  Standard 2-D views of the left breast as well as combination  2-D/3-D implant displaced views were performed.     COMPARISON:  The examination is compared to prior mammograms of the left  breast dating back to  07/06/2015.     FINDINGS: The breast tissue is composed of scattered areas of  fibroglandular densities. Retropectoral implant is again noted. No  suspicious masses, microcalcifications or areas of architectural  distortion are identified.     IMPRESSION:  BI-RADS 1 NEGATIVE EXAM      RECOMMENDATIONS:   Patient should return to routine annual screening  mammography of both breasts in 6 months time.     The standard false-negative rate of mammography is between 10% and 25%.   Complex patterns or increased breast density will markedly elevate the  false-negative rate of mammography.        A results letter, in lay terminology, will be given to the patient at  the conclusion of the exam.     This report was finalized on 1/7/2019 3:28 PM by Dr. Heidy Stanton MD.   EXAMINATION:  LEFT DIAGNOSTIC DIGITAL MAMMOGRAM AND TARGETED LEFT BREAST  ULTRASOUND     HISTORY: Recall from screening examination with Tomosynthesis for an  asymmetry in the medial left breast on the implant displaced view.     TECHNIQUE:  Combination 2-D 3-D left CC implant displaced spot  compression view as well as targeted left breast ultrasound.     FINDINGS: On additional imaging focused compression efface the area of  concern on screening mammography.  Due to the presence of an implant  targeted ultrasound to the medial left breast was performed. A cluster  of microcysts is identified measuring 4 mm at 11:00 4 cm from the  nipple. No suspicious masses are identified     IMPRESSION:  BI-RADS 3 probable benign findings left breast.       RECOMMENDATION: 6 month follow-up diagnostic left mammogram using 2-D  3-D technique for the asymmetry that did not appear to persist on  ultrasound.     The standard false-negative rate of mammography is between 10% and 25%.   Complex patterns or increased breast density will markedly elevate the  false-negative rate of mammography.        A results letter, in lay terminology, will be given to the patient at  the  conclusion of the exam.  EXAMINATION:  LEFT DIAGNOSTIC DIGITAL MAMMOGRAM AND TARGETED LEFT BREAST  ULTRASOUND     HISTORY: Recall from screening examination with Tomosynthesis for an  asymmetry in the medial left breast on the implant displaced view.     TECHNIQUE:  Combination 2-D 3-D left CC implant displaced spot  compression view as well as targeted left breast ultrasound.     FINDINGS: On additional imaging focused compression efface the area of  concern on screening mammography.  Due to the presence of an implant  targeted ultrasound to the medial left breast was performed. A cluster  of microcysts is identified measuring 4 mm at 11:00 4 cm from the  nipple. No suspicious masses are identified     IMPRESSION:  BI-RADS 3 probable benign findings left breast.       RECOMMENDATION: 6 month follow-up diagnostic left mammogram using 2-D  3-D technique for the asymmetry that did not appear to persist on  ultrasound.     The standard false-negative rate of mammography is between 10% and 25%.   Complex patterns or increased breast density will markedly elevate the  false-negative rate of mammography.        A results letter, in lay terminology, will be given to the patient at  the conclusion of the exam.        EXAMINATION:  BILATERAL SCREENING DIGITAL MAMMOGRAM WITH TOMOSYNTHESIS:         HISTORY: Screening Mammography.  43-year-old female with no family  history or personal history of breast or ovarian cancer.     TECHNIQUE:  Low Dose full field Digital Breast Tomosynthesis examination  was performed with 2D and 3D acquisitions. Standard as well as implant  displaced views were performed.     COMPARISON:   Examination is compared to prior examination dating back  to 01/11/2012. Examination is read in conjunction with computer aided  detection.     FINDINGS:   There are scattered areas of fibroglandular density.  Retropectoral implants are again noted. No suspicious masses,  microcalcifications or areas of  architectural distortion are identified  in the right breast. An asymmetry is identified posteromedially on the  left CC implant displaced view. No suspicious microcalcifications or  areas of architectural distortion are identified in the left breast.     IMPRESSION:  BI-RADS  0 INCOMPLETE: NEED ADDITIONAL IMAGING EVALUATION     RECOMMENDATION:  Implant displaced combination 2-D 3-D left CC spot  compression view. She will be contacted by our office to schedule an  appointment for the additional studies.  Please accept this as an order.     CAD was utilized.     The standard false-negative rate of mammography is between 10% and 25%.   Complex patterns or increased breast density will markedly elevate the  false-negative rate of mammography.        A letter, in lay terminology, with the results of this exam will be  mailed to the patient.        EXAMINATION: MRI THORACIC SPINE W WO CONTRAST-03/28/2018:     INDICATION: Mid-back/thoracic spine pain, first study.         TECHNIQUE: Sagittal and axial images of the thoracic spine are  displayed. No intravenous contrast was utilized.     COMPARISON: CT scan of the chest dated 01/26/2018.     FINDINGS: There are subtle degenerative changes at T3 and T6 with  subcortical cystic change. These are very minor findings. There is no  compression fracture. The thoracic vertebral bodies are normally aligned  with no evidence of disc space narrowing or disc protrusion. The  thoracic cord is normal. There is no abnormal cord size or signal. The  conus medullaris is normal.     IMPRESSION:  There are minimal degenerative subcortical cystic changes  noted at T3 and T6. These are minimal subtle changes consistent with  arthritic findings. The examination is otherwise normal with no  abnormality regarding the thoracic vertebral bodies, the thoracic cord  or the conus medullaris.     D:  03/28/2018  E:  03/28/2018    ADDENDUM:     Under the second paragraph of the findings listed in the  body of the  report the sentence should be corrected to read that there is a well  corticated benign appearing cyst in the leftward aspect of the T3  vertebral body.     No aggressive signs are identified associated with this cyst and its  presence is mentioned only to discard this as a suspicious lesion.     D:  02/08/2018  E:  02/08/2018     This report was finalized on 2/8/2018 1:00 PM by Dr. Koffi Daily MD.      Addended by Koffi Daily MD on 2/8/2018  1:00 PM      Study Result     EXAMINATION: CT CHEST WO CONTRAST - 01/26/2018     INDICATION:  G06.1-Intraspinal abscess and granuloma.     TECHNIQUE: CT data set of the chest and mediastinum was performed  without intravenous contrast.     The radiation dose reduction device was turned on for each scan per the  ALARA (As Low as Reasonably Achievable) protocol.     COMPARISON: None.     FINDINGS:   1. The apparent palpable lump or nodule seen on the rightward upper  anterior chest was marked with a fiducial and it is a normal  clavicular  head. The claviculomanubrial junction is intact without erosion. This is  a normal variant. Destructive clavicular or expansile clavicular lesion  is not identified.     2. There is a small well corticated [             ] cyst in the leftward  aspect of the L3 vertebral body. This does not break into the neural  canal and does not appear aggressive.     3. The extended window datasets of the lungs are clear and negative.  There is no evidence of dominant mass or active disease. There is mild  fibronodular scarring at the lung apices.     4. Thyroid and thoracic inlet are unremarkable. There is no evidence of  superior or mid mediastinal adenopathy or mass. The axillae are clear.  Augmentation breast implants are in place. Cardiac chambers and  pericardium are within normal limits. There is no evidence of hilar mass  or hilar asymmetry. Images into the upper abdomen are nonrevealing.     IMPRESSION:     Mild bossing of the  right clavicular head, normal variant. No clavicular  lesion, mass, erosion or expansile process is seen, and  the right  clavicular manubrial junction is normal.     No anterior chest wall pathologic lesion or mass is otherwise noted.  There is no soft tissue lesion.     Mild fibronodular scarring at the lung apices.     Well corticated inclusion cyst left aspect T3 vertebral body which  appears benign and nonaggressive and can be followed conservatively.     The lungs are clear and there is no central adenopathy. Acute or  significant pathologic disease in the chest or mediastinum is not  identified.     DICTATED:     01/27/2018        EXAMINATION: XR SPINE LUMBAR 4+ VW- 01/02/2018     INDICATION: M54.5-Low back pain      COMPARISON: NONE     FINDINGS: Imaging of the pelvis, AP, lateral, both oblique and spot  views of the lumbar spine reveal mild anterior spurring involving the L5  vertebral body. The vertebral body height and disc spaces are preserved.  Facets are well aligned. Pedicles are intact. No fracture or  dislocation. Imaging of the pelvis is unremarkable.         IMPRESSION:  Unremarkable imaging of the lumbar spine.     D:  01/03/2018    EXAMINATION: XR CHEST PA AND LATERAL- 01/02/2018     INDICATION: RIB PAIN; R07.81-Pleurodynia      COMPARISON: Chest x-ray 11/02/2013     FINDINGS: Cardiac silhouette within normal limits. Pulmonary vascularity  within normal limits. Chronic lung changes including hyperinflated  appearance as well as healed granulomatous involvement without focal  consolidation. No pneumothorax or pleural effusion.         IMPRESSION:  Chronic lung changes without acute cardiopulmonary process.  If there is further concern for soft tissue lesion right anterior chest  consider CT of chest for further evaluation.     D:  01/03/2018    HISTORY: Screening Mammography.        Low Dose full field Digital Breast Tomosynthesis examination was  performed with 2D and 3D acquisitions. Standard  as well as implant  displaced views were performed Examination is compared to prior  examination dating back to 12/19/2013. Examination is read in  conjunction with computer aided detection.        FINDINGS:   There are scattered areas of fibroglandular density.  Retropectoral implants are again noted No suspicious masses,  microcalcifications or  areas of architectural distortion are present.     IMPRESSION:  Negative bilateral mammogram.     RECOMMENDATION:  Continue annual screening mammography.     BI-RADS CATEGORY I, NEGATIVE.        CAD was utilized.     The standard false-negative rate of mammography is between 10% and 25%.   Complex patterns or increased breast density will markedly elevate the  false-negative rate of mammography.        A letter, in lay terminology, with the results of this exam will be  mailed to the patient.       This report was finalized on 3/1/2017 1:25 PM by Dr. Heidy Stanton MD.      EXAMINATION- OU-THYROID OR NECK      INDICATION- neck mass, palpable area below left ear     TECHNIQUE- Sonographic imaging was obtained of the left neck below the  left ear in both the sagittal and transverse planes.        COMPARISON- NONE     FINDINGS- There are several small lymph nodes identified within the  subcutaneous tissues posterior to the left ear in the area of interest.  The lymph nodes are normal in architecture with fatty hilum and a thin  cortex. The largest measures 1.4 x 0.9 x 1.5 cm. No underlying mass or  abnormal fluid collection seen within the soft tissues.         IMPRESSION- Several normal-appearing lymph nodes seen in the area of  interest posterior to the left ear. No abnormal mass or fluid collection  seen within the soft tissues.     D-  05/09/2016    There is no immunization history on file for this patient.    Tamara was seen today for annual exam.    Diagnoses and all orders for this visit:    Well adult exam  Patient will return when fasting to check labs.  Patient  is currently under the care of gynecology will need to request records.  Reviewed all of her imaging studies from [id number].  In summary she had lymphadenopathy left posterior auricular which now appears resolved.  She has some degenerative cystic changes in the thoracic spine but is asymptomatic.  She has had follow-up mammograms for asymmetry which have now since been normal.  She has a right clavicular prominence which is benign.  Pure hypercholesterolemia  -     Lipid Panel; Future  -     Comprehensive Metabolic Panel; Future  Patient has mild elevation in her total cholesterol.  Counseled on dietary changes.  No need for statin at this time.  Return when fasting to check labs.  Vitamin D deficiency  -     Vitamin D 25 Hydroxy; Future  Patient has a history of vitamin D deficiency and is currently on replacement with a multivitamin.  Check with labs.  Need for vaccination  Patient will consider hepatitis A vaccine and Tdap vaccine and return to the nurse visit if she decides to get them.      The ASCVD Risk score (Kira DARRICK Jr., et al., 2013) failed to calculate for the following reasons:    The valid HDL cholesterol range is 20 to 100 mg/dL      Patient's Body mass index is 21.8 kg/m². BMI is within normal parameters. No follow-up required..      Counseled on health maintenance topics and preventative care recommendations: Influenza vaccine, Tdap vaccine, hepatitis A vaccine, cervical cancer screening     Return in about 1 year (around 11/10/2020) for Physical.    Dr. Lilly Vazquez

## 2019-11-09 NOTE — PATIENT INSTRUCTIONS
Tdap Vaccine (Tetanus, Diphtheria and Pertussis): What You Need to Know  1. Why get vaccinated?  Tetanus, diphtheria and pertussis are very serious diseases. Tdap vaccine can protect us from these diseases. And, Tdap vaccine given to pregnant women can protect  babies against pertussis..  TETANUS (Lockjaw) is rare in the [place] today. It causes painful muscle tightening and stiffness, usually all over the body.  · It can lead to tightening of muscles in the head and neck so you can't open your mouth, swallow, or sometimes even breathe. Tetanus kills about 1 out of 10 people who are infected even after receiving the best medical care.  DIPHTHERIA is also rare in the [place] today. It can cause a thick coating to form in the back of the throat.  · It can lead to breathing problems, heart failure, paralysis, and death.  PERTUSSIS (Whooping Cough) causes severe coughing spells, which can cause difficulty breathing, vomiting and disturbed sleep.  · It can also lead to weight loss, incontinence, and rib fractures. Up to 2 in 100 adolescents and 5 in 100 adults with pertussis are hospitalized or have complications, which could include pneumonia or death.  These diseases are caused by bacteria. Diphtheria and pertussis are spread from person to person through secretions from coughing or sneezing. Tetanus enters the body through cuts, scratches, or wounds.  Before vaccines, as many as 200,000 cases of diphtheria, 200,000 cases of pertussis, and hundreds of cases of tetanus, were reported in the [place] each year. Since vaccination began, reports of cases for tetanus and diphtheria have dropped by about 99% and for pertussis by about 80%.  2. Tdap vaccine  Tdap vaccine can protect adolescents and adults from tetanus, diphtheria, and pertussis. One dose of Tdap is routinely given at age 11 or 12. People who did not get Tdap at that age should get it as soon as possible.  Tdap is especially important  for healthcare professionals and anyone having close contact with a baby younger than 12 months.  Pregnant women should get a dose of Tdap during every pregnancy, to protect the  from pertussis. Infants are most at risk for severe, life-threatening complications from pertussis.  Another vaccine, called Td, protects against tetanus and diphtheria, but not pertussis. A Td booster should be given every 10 years. Tdap may be given as one of these boosters if you have never gotten Tdap before. Tdap may also be given after a severe cut or burn to prevent tetanus infection.  Your doctor or the person giving you the vaccine can give you more information.  Tdap may safely be given at the same time as other vaccines.  3. Some people should not get this vaccine  · A person who has ever had a life-threatening allergic reaction after a previous dose of any diphtheria, tetanus or pertussis containing vaccine, OR has a severe allergy to any part of this vaccine, should not get Tdap vaccine. Tell the person giving the vaccine about any severe allergies.  · Anyone who had coma or long repeated seizures within 7 days after a childhood dose of DTP or DTaP, or a previous dose of Tdap, should not get Tdap, unless a cause other than the vaccine was found. They can still get Td.  · Talk to your doctor if you:  ? have seizures or another nervous system problem,  ? had severe pain or swelling after any vaccine containing diphtheria, tetanus or pertussis,  ? ever had a condition called Guillain-Barré Syndrome (GBS),  ? aren't feeling well on the day the shot is scheduled.  4. Risks  With any medicine, including vaccines, there is a chance of side effects. These are usually mild and go away on their own. Serious reactions are also possible but are rare.  Most people who get Tdap vaccine do not have any problems with it.  Mild problems following Tdap  (Did not interfere with activities)  · Pain where the shot was given (about 3 in 4  adolescents or 2 in 3 adults)  · Redness or swelling where the shot was given (about 1 person in 5)  · Mild fever of at least 100.4°F (up to about 1 in 25 adolescents or 1 in 100 adults)  · Headache (about 3 or 4 people in 10)  · Tiredness (about 1 person in 3 or 4)  · Nausea, vomiting, diarrhea, stomach ache (up to 1 in 4 adolescents or 1 in 10 adults)  · Chills, sore joints (about 1 person in 10)  · Body aches (about 1 person in 3 or 4)  · Rash, swollen glands (uncommon)  Moderate problems following Tdap  (Interfered with activities, but did not require medical attention)  · Pain where the shot was given (up to 1 in 5 or 6)  · Redness or swelling where the shot was given (up to about 1 in 16 adolescents or 1 in 12 adults)  · Fever over 102°F (about 1 in 100 adolescents or 1 in 250 adults)  · Headache (about 1 in 7 adolescents or 1 in 10 adults)  · Nausea, vomiting, diarrhea, stomach ache (up to 1 or 3 people in 100)  · Swelling of the entire arm where the shot was given (up to about 1 in 500).  Severe problems following Tdap  (Unable to perform usual activities; required medical attention)  · Swelling, severe pain, bleeding and redness in the arm where the shot was given (rare).  Problems that could happen after any vaccine:  · People sometimes faint after a medical procedure, including vaccination. Sitting or lying down for about 15 minutes can help prevent fainting, and injuries caused by a fall. Tell your doctor if you feel dizzy, or have vision changes or ringing in the ears.  · Some people get severe pain in the shoulder and have difficulty moving the arm where a shot was given. This happens very rarely.  · Any medication can cause a severe allergic reaction. Such reactions from a vaccine are very rare, estimated at fewer than 1 in a million doses, and would happen within a few minutes to a few hours after the vaccination.  As with any medicine, there is a very remote chance of a vaccine causing a serious  injury or death.  The safety of vaccines is always being monitored. For more information, visit: www.cdc.gov/vaccinesafety/  5. What if there is a serious problem?  What should I look for?  · Look for anything that concerns you, such as signs of a severe allergic reaction, very high fever, or unusual behavior.  Signs of a severe allergic reaction can include hives, swelling of the face and throat, difficulty breathing, a fast heartbeat, dizziness, and weakness. These would usually start a few minutes to a few hours after the vaccination.  What should I do?  · If you think it is a severe allergic reaction or other emergency that can't wait, call 9-1-1 or get the person to the nearest hospital. Otherwise, call your doctor.  · Afterward, the reaction should be reported to the Vaccine Adverse Event Reporting System (VAERS). Your doctor might file this report, or you can do it yourself through the VAERS web site at www.vaers.[facility].gov, or by calling 1-117.115.5010.  [employer] does not give medical advice.  6. The National Vaccine Injury Compensation Program  The National Vaccine Injury Compensation Program (VICP) is a federal program that was created to compensate people who may have been injured by certain vaccines.  Persons who believe they may have been injured by a vaccine can learn about the program and about filing a claim by calling 1-701.606.3209 or visiting the VICP website at www.hrsa.gov/vaccinecompensation. There is a time limit to file a claim for compensation.  7. How can I learn more?  · Ask your doctor. He or she can give you the vaccine package insert or suggest other sources of information.  · Call your local or state health department.  · Contact the Centers for Disease Control and Prevention (CDC):  ? Call 1-892.767.3553 (9-371-CLN-INFO) or  ? Visit CDC's website at www.cdc.gov/vaccines  Vaccine Information Statement Tdap Vaccine (2/24/2015)  This information is not intended to replace advice given to you  by your health care provider. Make sure you discuss any questions you have with your health care provider.  Document Released: 06/18/2013 Document Revised: 08/05/2019 Document Reviewed: 08/05/2019  [employer] Patient Education © 2019 [employer]    Hepatitis A Vaccine, Inactivated suspension for injection  What is this medicine?  HEPATITIS A VACCINE (hep uh CLIFF bella) is a vaccine to protect from an infection with the hepatitis A virus. This vaccine does not contain the live virus. It will not cause a hepatitis infection. This vaccine is also used with immunoglobulin to prevent infection in people who have been exposed to hepatitis A.  This medicine may be used for other purposes; ask your health care provider or pharmacist if you have questions.  COMMON BRAND NAME(S): Havrix, Vaqta  What should I tell my health care provider before I take this medicine?  They need to know if you have any of these conditions:  -bleeding disorder  -fever or infection  -heart disease  -immune system problems  -an unusual or allergic reaction to hepatitis A vaccine, latex, neomycin, other medicines, foods, dyes, or preservatives  -pregnant or trying to get pregnant  -breast-feeding  How should I use this medicine?  This vaccine is for injection into a muscle. It is given by a health care professional.  A copy of Vaccine Information Statements will be given before each vaccination. Read this sheet carefully each time. The sheet may change frequently.  Talk to your pediatrician regarding the use of this medicine in children. While this drug may be prescribed for children as young as 12 months of age for selected conditions, precautions do apply.  Overdosage: If you think you have taken too much of this medicine contact a poison control center or emergency room at once.  NOTE: This medicine is only for you. Do not share this medicine with others.  What if I miss a dose?  This does not apply.  What may interact with  this medicine?  -medicines to treat cancer  -medicines that suppress your immune function like adalimumab, anakinra, etanercept, infliximab  -steroid medicines like prednisone or cortisone  This list may not describe all possible interactions. Give your health care provider a list of all the medicines, herbs, non-prescription drugs, or dietary supplements you use. Also tell them if you smoke, drink alcohol, or use illegal drugs. Some items may interact with your medicine.  What should I watch for while using this medicine?  See your health care provider for a booster shot of this vaccine as directed. Tell your doctor right away if you have any serious or unusual side effects after getting this vaccine.  You will not have protection from the hepatitis A virus for at least 8 to 10 days after your first injection. The length of time you will have protection from hepatitis A virus infection is not known. Check with your doctor if you have questions about your immunity. See your doctor before you travel out of the country.  What side effects may I notice from receiving this medicine?  Side effects that you should report to your doctor or health care professional as soon as possible:  -allergic reactions like skin rash, itching or hives, swelling of the face, lips, or tongue  -breathing problems  -seizures  -yellowing of the eyes or skin  Side effects that usually do not require medical attention (report to your doctor or health care professional if they continue or are bothersome):  -diarrhea  -fever  -loss of appetite  -muscle pain  -nausea  -pain, redness, swelling or irritation at site where injected  -tiredness  This list may not describe all possible side effects. Call your doctor for medical advice about side effects. You may report side effects to FDA at 1-649-FDA-6241.  Where should I keep my medicine?  This drug is given in a hospital or clinic and will not be stored at home.  NOTE: This sheet is a summary. It  may not cover all possible information. If you have questions about this medicine, talk to your doctor, pharmacist, or health care provider.  © 2019 [employer]/Gold Standard (2015-04-20 13:19:40)    Hepatitis A Vaccine, Inactivated suspension for injection  What is this medicine?  HEPATITIS A VACCINE (hep uh CLIFF bella) is a vaccine to protect from an infection with the hepatitis A virus. This vaccine does not contain the live virus. It will not cause a hepatitis infection. This vaccine is also used with immunoglobulin to prevent infection in people who have been exposed to hepatitis A.  This medicine may be used for other purposes; ask your health care provider or pharmacist if you have questions.  COMMON BRAND NAME(S): Havrix, Vaqta  What should I tell my health care provider before I take this medicine?  They need to know if you have any of these conditions:  -bleeding disorder  -fever or infection  -heart disease  -immune system problems  -an unusual or allergic reaction to hepatitis A vaccine, latex, neomycin, other medicines, foods, dyes, or preservatives  -pregnant or trying to get pregnant  -breast-feeding  How should I use this medicine?  This vaccine is for injection into a muscle. It is given by a health care professional.  A copy of Vaccine Information Statements will be given before each vaccination. Read this sheet carefully each time. The sheet may change frequently.  Talk to your pediatrician regarding the use of this medicine in children. While this drug may be prescribed for children as young as 12 months of age for selected conditions, precautions do apply.  Overdosage: If you think you have taken too much of this medicine contact a poison control center or emergency room at once.  NOTE: This medicine is only for you. Do not share this medicine with others.  What if I miss a dose?  This does not apply.  What may interact with this medicine?  -medicines to treat cancer  -medicines that  suppress your immune function like adalimumab, anakinra, etanercept, infliximab  -steroid medicines like prednisone or cortisone  This list may not describe all possible interactions. Give your health care provider a list of all the medicines, herbs, non-prescription drugs, or dietary supplements you use. Also tell them if you smoke, drink alcohol, or use illegal drugs. Some items may interact with your medicine.  What should I watch for while using this medicine?  See your health care provider for a booster shot of this vaccine as directed. Tell your doctor right away if you have any serious or unusual side effects after getting this vaccine.  You will not have protection from the hepatitis A virus for at least 8 to 10 days after your first injection. The length of time you will have protection from hepatitis A virus infection is not known. Check with your doctor if you have questions about your immunity. See your doctor before you travel out of the country.  What side effects may I notice from receiving this medicine?  Side effects that you should report to your doctor or health care professional as soon as possible:  -allergic reactions like skin rash, itching or hives, swelling of the face, lips, or tongue  -breathing problems  -seizures  -yellowing of the eyes or skin  Side effects that usually do not require medical attention (report to your doctor or health care professional if they continue or are bothersome):  -diarrhea  -fever  -loss of appetite  -muscle pain  -nausea  -pain, redness, swelling or irritation at site where injected  -tiredness  This list may not describe all possible side effects. Call your doctor for medical advice about side effects. You may report side effects to FDA at 1-006-FDA-1091.  Where should I keep my medicine?  This drug is given in a hospital or clinic and will not be stored at home.  NOTE: This sheet is a summary. It may not cover all possible information. If you have questions  about this medicine, talk to your doctor, pharmacist, or health care provider.  © 2019 Elsevier/Gold Standard (2015-04-20 13:19:40)

## 2019-12-05 ENCOUNTER — LAB (OUTPATIENT)
Dept: LAB | Facility: HOSPITAL | Age: 44
End: 2019-12-05

## 2019-12-05 DIAGNOSIS — Z13.21 ENCOUNTER FOR VITAMIN DEFICIENCY SCREENING: Primary | ICD-10-CM

## 2019-12-05 DIAGNOSIS — E78.00 PURE HYPERCHOLESTEROLEMIA: ICD-10-CM

## 2019-12-05 DIAGNOSIS — E55.9 VITAMIN D DEFICIENCY: ICD-10-CM

## 2019-12-05 LAB
25(OH)D3 SERPL-MCNC: 26.8 NG/ML (ref 30–100)
ALBUMIN SERPL-MCNC: 4.9 G/DL (ref 3.5–5.2)
ALBUMIN/GLOB SERPL: 1.5 G/DL
ALP SERPL-CCNC: 65 U/L (ref 39–117)
ALT SERPL W P-5'-P-CCNC: 18 U/L (ref 1–33)
ANION GAP SERPL CALCULATED.3IONS-SCNC: 13.3 MMOL/L (ref 5–15)
AST SERPL-CCNC: 22 U/L (ref 1–32)
BILIRUB SERPL-MCNC: 0.4 MG/DL (ref 0.2–1.2)
BUN BLD-MCNC: 12 MG/DL (ref 6–20)
BUN/CREAT SERPL: 19 (ref 7–25)
CALCIUM SPEC-SCNC: 9.6 MG/DL (ref 8.6–10.5)
CHLORIDE SERPL-SCNC: 99 MMOL/L (ref 98–107)
CHOLEST SERPL-MCNC: 242 MG/DL (ref 0–200)
CO2 SERPL-SCNC: 24.7 MMOL/L (ref 22–29)
CREAT BLD-MCNC: 0.63 MG/DL (ref 0.57–1)
GFR SERPL CREATININE-BSD FRML MDRD: 102 ML/MIN/1.73
GLOBULIN UR ELPH-MCNC: 3.3 GM/DL
GLUCOSE BLD-MCNC: 86 MG/DL (ref 65–99)
HDLC SERPL-MCNC: 122 MG/DL (ref 40–60)
LDLC SERPL CALC-MCNC: 112 MG/DL (ref 0–100)
LDLC/HDLC SERPL: 0.92 {RATIO}
POTASSIUM BLD-SCNC: 4 MMOL/L (ref 3.5–5.2)
PROT SERPL-MCNC: 8.2 G/DL (ref 6–8.5)
SODIUM BLD-SCNC: 137 MMOL/L (ref 136–145)
TRIGL SERPL-MCNC: 40 MG/DL (ref 0–150)
VIT B12 BLD-MCNC: 831 PG/ML (ref 211–946)
VLDLC SERPL-MCNC: 8 MG/DL (ref 5–40)

## 2019-12-05 PROCEDURE — 82306 VITAMIN D 25 HYDROXY: CPT

## 2019-12-05 PROCEDURE — 80061 LIPID PANEL: CPT

## 2019-12-05 PROCEDURE — 82607 VITAMIN B-12: CPT

## 2019-12-05 PROCEDURE — 80053 COMPREHEN METABOLIC PANEL: CPT

## 2019-12-16 ENCOUNTER — LAB REQUISITION (OUTPATIENT)
Dept: LAB | Facility: HOSPITAL | Age: 44
End: 2019-12-16

## 2019-12-16 DIAGNOSIS — N92.0 EXCESSIVE AND FREQUENT MENSTRUATION WITH REGULAR CYCLE: ICD-10-CM

## 2019-12-16 PROCEDURE — 88305 TISSUE EXAM BY PATHOLOGIST: CPT | Performed by: OBSTETRICS & GYNECOLOGY

## 2019-12-18 LAB
CYTO UR: NORMAL
LAB AP CASE REPORT: NORMAL
LAB AP CLINICAL INFORMATION: NORMAL
PATH REPORT.FINAL DX SPEC: NORMAL
PATH REPORT.GROSS SPEC: NORMAL

## 2020-03-04 ENCOUNTER — TELEPHONE (OUTPATIENT)
Dept: FAMILY MEDICINE CLINIC | Facility: CLINIC | Age: 45
End: 2020-03-04

## 2020-03-04 NOTE — TELEPHONE ENCOUNTER
"Attempted to call patient back regarding her questions. No answer. LVM with office # given.     Please get specific questions on callback    \"The test results show that your vitamin D is slightly low and I recommend increasing your daily vitamin D supplement.  Cholesterol level is higher than last year, I recommend that you continue working on diet.  If cholesterol numbers continue to increase may need to consider using statin medication to lower cholesterol.  Fasting sugar is normal and no diabetes.  Normal kidney function.  Normal electrolytes.  Normal liver function.  Normal B12 level.\"  "

## 2020-03-04 NOTE — TELEPHONE ENCOUNTER
PT CALLED REGARDING HER LABS FROM December AND HAS SOME QUESTIONS AND CONCERNS--MORE SO WITH HER VITAMIN D AND HER CHOLESTEROL LEVELS  PLEASE CONTACT    RAFA: [phone]

## 2020-10-13 ENCOUNTER — TELEPHONE (OUTPATIENT)
Dept: OBSTETRICS AND GYNECOLOGY | Facility: CLINIC | Age: 45
End: 2020-10-13

## 2020-10-13 NOTE — TELEPHONE ENCOUNTER
Pt with h/o fibroids. Last u/s 4/2020-discussed options for menorrhagia. Given lysteda but did not start.     Pt now c/o clotting/heavy bleeding on and off since Friday. Currently day 11 of bleeding. States feeling better today-bleeding seems to be slowing down, denies pain.     Instructed pt to rest, take ibuprofen 600mg Q6H. Apt with u/s 10/23/20 with LOS.     Pt to cb if soaking <1H or increased pain. She VU

## 2020-10-23 ENCOUNTER — OFFICE VISIT (OUTPATIENT)
Dept: OBSTETRICS AND GYNECOLOGY | Facility: CLINIC | Age: 45
End: 2020-10-23

## 2020-10-23 VITALS
BODY MASS INDEX: 21.48 KG/M2 | WEIGHT: 125.8 LBS | SYSTOLIC BLOOD PRESSURE: 104 MMHG | DIASTOLIC BLOOD PRESSURE: 66 MMHG | HEIGHT: 64 IN

## 2020-10-23 DIAGNOSIS — D21.9 FIBROIDS: ICD-10-CM

## 2020-10-23 DIAGNOSIS — N92.0 MENORRHAGIA WITH REGULAR CYCLE: ICD-10-CM

## 2020-10-23 DIAGNOSIS — D25.1 INTRAMURAL LEIOMYOMA OF UTERUS: ICD-10-CM

## 2020-10-23 DIAGNOSIS — N93.9 ABNORMAL UTERINE BLEEDING (AUB): Primary | ICD-10-CM

## 2020-10-23 PROCEDURE — 99213 OFFICE O/P EST LOW 20 MIN: CPT | Performed by: OBSTETRICS & GYNECOLOGY

## 2020-10-23 NOTE — PROGRESS NOTES
Chief Complaint   Patient presents with   • Gynecologic Exam   • Menorrhagia       Subjective   HPI  Tamara Richardson is a 45 y.o. female, , who presents for abnormal bleeding followup and ultrasound today.      Patient states the abnormal bleeding has been going on for years, and feels like it is worsening.     Her last LMP was 10/03/2020.  Periods are regular every 28-30 days, lasting 8 days.  Dysmenorrhea:mild, occurring first 1-2 days of flow.  Patient reports problems with: fatigue.  Partner Status: Marital Status: .  New Partners since last visit: no.  Desires STD Screening: no.    Additional OB/GYN History   Current contraception: contraceptive methods: None  Desires to: do not start contraception  Last Pap : 05/10/2018   Last Completed Pap Smear       Status Date      PAP SMEAR Done 2016 karin collins        History of abnormal Pap smear: no  Last mammogram: 2020 - normal  Last Completed Mammogram     Patient has no health maintenance due at this time        Tobacco Usage?: No   OB History        2    Para   2    Term   2            AB        Living           SAB        TAB        Ectopic        Molar        Multiple        Live Births                    Health Maintenance   Topic Date Due   • Annual Gynecologic Pelvic and Breast Exam  1974   • COLONOSCOPY  1974   • HEPATITIS A VACCINE ADULT (1 of 2) 06/10/1992   • TDAP/TD VACCINES (1 - Tdap) 06/10/1993   • HEPATITIS C SCREENING  2018   • PAP SMEAR  2019   • INFLUENZA VACCINE  2020   • ANNUAL PHYSICAL  11/10/2020   • LIPID PANEL  2020   • Pneumococcal Vaccine 0-64  Aged Out       The additional following portions of the patient's history were reviewed and updated as appropriate: allergies, current medications, past family history, past medical history, past social history, past surgical history and problem list.    Review of Systems    I have reviewed and agree with the HPI, ROS, and  "historical information as entered above. Elizabeth Francisco MD    Objective   /66   Ht 162.6 cm (64\")   Wt 57.1 kg (125 lb 12.8 oz)   BMI 21.59 kg/m²     Physical Exam  Constitutional:       Appearance: She is well-developed.   HENT:      Head: Normocephalic.   Eyes:      Conjunctiva/sclera: Conjunctivae normal.   Pulmonary:      Effort: Pulmonary effort is normal.   Psychiatric:         Behavior: Behavior normal.         Assessment/Plan         Problem List Items Addressed This Visit        Genitourinary    Menorrhagia with regular cycle    Intramural leiomyoma of uterus      Other Visit Diagnoses     Abnormal uterine bleeding (AUB)    -  Primary    Relevant Orders    US Non-ob Transvaginal    TSH    Vitamin B12    Folate    CBC & Differential    Fibroids        Relevant Orders    US Non-ob Transvaginal            Plan     Return if symptoms worsen or fail to improve, for Preop in January.   2.  Menorrhagia with multiple leiomyoma-none of these appear to be subserosal.  2 subserosal fibroids were taken out by MyoSure removal this past January.  They were told over 2 cm which made her not a candidate for NovaSure ablation at that time.  However her heavy periods have continued despite removal of the fibroids and what there are new intramural fibroids noted on ultrasound there are no new subserosal fibroids and therefore she is a candidate for NovaSure endometrial ablation.  She would like to do this in January in case it fails and she needs a hysterectomy within the same year.  She will follow-up for preop and endometrial biopsy prior to her surgery      Elizabeth Francisco MD  10/23/2020  "

## 2020-10-24 LAB
BASOPHILS # BLD AUTO: 0.01 10*3/MM3 (ref 0–0.2)
BASOPHILS NFR BLD AUTO: 0.2 % (ref 0–1.5)
EOSINOPHIL # BLD AUTO: 0.02 10*3/MM3 (ref 0–0.4)
EOSINOPHIL NFR BLD AUTO: 0.4 % (ref 0.3–6.2)
ERYTHROCYTE [DISTWIDTH] IN BLOOD BY AUTOMATED COUNT: 13.5 % (ref 12.3–15.4)
HCT VFR BLD AUTO: 37.6 % (ref 34–46.6)
HGB BLD-MCNC: 12.4 G/DL (ref 12–15.9)
IMM GRANULOCYTES # BLD AUTO: 0.01 10*3/MM3 (ref 0–0.05)
IMM GRANULOCYTES NFR BLD AUTO: 0.2 % (ref 0–0.5)
LYMPHOCYTES # BLD AUTO: 1.28 10*3/MM3 (ref 0.7–3.1)
LYMPHOCYTES NFR BLD AUTO: 26 % (ref 19.6–45.3)
MCH RBC QN AUTO: 28.1 PG (ref 26.6–33)
MCHC RBC AUTO-ENTMCNC: 33 G/DL (ref 31.5–35.7)
MCV RBC AUTO: 85.3 FL (ref 79–97)
MONOCYTES # BLD AUTO: 0.41 10*3/MM3 (ref 0.1–0.9)
MONOCYTES NFR BLD AUTO: 8.3 % (ref 5–12)
NEUTROPHILS # BLD AUTO: 3.19 10*3/MM3 (ref 1.7–7)
NEUTROPHILS NFR BLD AUTO: 64.9 % (ref 42.7–76)
NRBC BLD AUTO-RTO: 0 /100 WBC (ref 0–0.2)
PLATELET # BLD AUTO: 318 10*3/MM3 (ref 140–450)
RBC # BLD AUTO: 4.41 10*6/MM3 (ref 3.77–5.28)
TSH SERPL DL<=0.005 MIU/L-ACNC: 1.31 UIU/ML (ref 0.27–4.2)
WBC # BLD AUTO: 4.92 10*3/MM3 (ref 3.4–10.8)

## 2020-10-25 ENCOUNTER — RESULTS ENCOUNTER (OUTPATIENT)
Dept: OBSTETRICS AND GYNECOLOGY | Facility: CLINIC | Age: 45
End: 2020-10-25

## 2020-10-25 DIAGNOSIS — N93.9 ABNORMAL UTERINE BLEEDING (AUB): ICD-10-CM

## 2020-10-28 ENCOUNTER — TELEPHONE (OUTPATIENT)
Dept: OBSTETRICS AND GYNECOLOGY | Facility: CLINIC | Age: 45
End: 2020-10-28

## 2020-10-28 NOTE — TELEPHONE ENCOUNTER
The pain is at the site where her blood was drawn 5 days ago.  Not pelvic pain.      The area is not black and blue and it not swollen.  The pain is an ache and she notices it worse when sitting or reading.  Instructed to take tylenol and warm heating pad when resting.  Call if not better

## 2021-01-08 ENCOUNTER — OFFICE VISIT (OUTPATIENT)
Dept: FAMILY MEDICINE CLINIC | Facility: CLINIC | Age: 46
End: 2021-01-08

## 2021-01-08 VITALS
DIASTOLIC BLOOD PRESSURE: 60 MMHG | WEIGHT: 129.4 LBS | BODY MASS INDEX: 22.09 KG/M2 | HEIGHT: 64 IN | OXYGEN SATURATION: 99 % | HEART RATE: 75 BPM | SYSTOLIC BLOOD PRESSURE: 112 MMHG

## 2021-01-08 DIAGNOSIS — Z00.00 WELL ADULT EXAM: Primary | ICD-10-CM

## 2021-01-08 DIAGNOSIS — D48.9 NEOPLASM, UNCERTAIN WHETHER BENIGN OR MALIGNANT: ICD-10-CM

## 2021-01-08 DIAGNOSIS — E55.9 VITAMIN D DEFICIENCY: ICD-10-CM

## 2021-01-08 DIAGNOSIS — Z12.31 VISIT FOR SCREENING MAMMOGRAM: ICD-10-CM

## 2021-01-08 DIAGNOSIS — G89.29 CHRONIC KNEE PAIN, UNSPECIFIED LATERALITY: ICD-10-CM

## 2021-01-08 DIAGNOSIS — Z12.11 SCREENING FOR MALIGNANT NEOPLASM OF COLON: ICD-10-CM

## 2021-01-08 DIAGNOSIS — E78.00 PURE HYPERCHOLESTEROLEMIA: ICD-10-CM

## 2021-01-08 DIAGNOSIS — Z13.1 SCREENING FOR DIABETES MELLITUS: ICD-10-CM

## 2021-01-08 DIAGNOSIS — M25.569 CHRONIC KNEE PAIN, UNSPECIFIED LATERALITY: ICD-10-CM

## 2021-01-08 PROCEDURE — 99396 PREV VISIT EST AGE 40-64: CPT | Performed by: FAMILY MEDICINE

## 2021-01-08 NOTE — PROGRESS NOTES
Tamara Richardson presents today for a physical    Chief Complaint   Patient presents with   • Annual Exam     pt states she is doing well         HPI     Cyst on her back. Denies pain. Cyst present since 2017.     Fairly healthy diet. She doesn't eat junk food or sweets daily. Fast food 2 times a month.     She had to stop group exercise with COVID.     Sleep is not great. Sleeps bad 2 nights out of the week.     Knee pain periodically walking down steps.     New skin lesion on right upper chest.     She has cyst on her bikini line. She is concerned from infected hair.           Patient Active Problem List   Diagnosis   • Allergy   • Hyperlipidemia   • Menorrhagia with regular cycle   • Intramural leiomyoma of uterus   • Perennial allergic rhinitis   • Vitamin D deficiency       Current Outpatient Medications   Medication Sig Dispense Refill   • Multiple Vitamins-Minerals (MULTIVITAMIN WITH MINERALS) tablet Take 1 tablet by mouth Daily.       No current facility-administered medications for this visit.        No Known Allergies     Past Medical History:   Diagnosis Date   • Breast lump    • HPV (human papilloma virus) infection    • Hyperlipidemia    • Pericarditis         Past Surgical History:   Procedure Laterality Date   • AUGMENTATION MAMMAPLASTY Bilateral     2010   • REFRACTIVE SURGERY     • WISDOM TOOTH EXTRACTION          Family History   Problem Relation Age of Onset   • Atrial fibrillation Mother    • Hyperlipidemia Mother    • Cancer Father         kidney   • Hyperlipidemia Brother    • Allergy (severe) Daughter         treenut   • Arthritis Maternal Grandmother    • Heart disease Other    • Stroke Other    • Breast cancer Neg Hx    • Ovarian cancer Neg Hx    • Diabetes Neg Hx    • Colon cancer Neg Hx         Social History     Socioeconomic History   • Marital status:      Spouse name: Eugenio   • Number of children: 2   • Years of education: Not on file   • Highest education level: Not on file  "  Occupational History   • Occupation: School Psychologist     Comment: [employer]   Tobacco Use   • Smoking status: Never Smoker   • Smokeless tobacco: Never Used   Substance and Sexual Activity   • Alcohol use: Yes     Comment: social   • Drug use: No   • Sexual activity: Yes     Partners: Male   Social History Narrative            Vitals:    01/08/21 0811   BP: 112/60   Pulse: 75   SpO2: 99%   Weight: 58.7 kg (129 lb 6.4 oz)   Height: 162.6 cm (64.02\")   PainSc: 0-No pain      Body mass index is 22.2 kg/m².    Patient's Body mass index is 22.2 kg/m². BMI is within normal parameters. No follow-up required..      Physical Exam  Constitutional:       General: She is not in acute distress.  HENT:      Right Ear: Tympanic membrane and ear canal normal.      Left Ear: Tympanic membrane and ear canal normal.   Eyes:      General:         Right eye: No discharge.         Left eye: No discharge.      Conjunctiva/sclera: Conjunctivae normal.   Neck:      Musculoskeletal: Neck supple.      Thyroid: No thyromegaly.   Cardiovascular:      Rate and Rhythm: Normal rate and regular rhythm.      Heart sounds: Normal heart sounds. No murmur.   Pulmonary:      Effort: Pulmonary effort is normal.      Breath sounds: Normal breath sounds.   Abdominal:      General: Bowel sounds are normal.      Palpations: Abdomen is soft.      Tenderness: There is no abdominal tenderness.   Lymphadenopathy:      Head:      Right side of head: No submandibular, preauricular or posterior auricular adenopathy.      Left side of head: No submandibular, preauricular or posterior auricular adenopathy.      Cervical: No cervical adenopathy.   Skin:     General: Skin is warm and dry.      Comments: Right anterior chest inferior to clavicle erythematous rolled border papule 8 mm.  Right months.  This month meter firm subcutaneous induration with mild maroonish discoloration.  No bleeding or drainage.  Midline thoracic rubbery cystic structure. "   Neurological:      Mental Status: She is alert and oriented to person, place, and time.   Psychiatric:         Mood and Affect: Mood normal.         Behavior: Behavior normal.         Thought Content: Thought content normal.         Judgment: Judgment normal.          CBC w/Diff   Lab Results   Component Value Date    WBC 4.92 10/23/2020    RBC 4.41 10/23/2020    HGB 12.4 10/23/2020    HCT 37.6 10/23/2020    MCV 85.3 10/23/2020    MCH 28.1 10/23/2020    MCHC 33.0 10/23/2020    RDW 13.5 10/23/2020    RDWSD 43.5 10/18/2018    MPV 10.4 10/18/2018     10/23/2020    NEUTRORELPCT 64.9 10/23/2020    LYMPHORELPCT 26.0 10/23/2020    MONORELPCT 8.3 10/23/2020    EOSRELPCT 0.4 10/23/2020    BASORELPCT 0.2 10/23/2020    AUTOIGPER 0.2 10/18/2018    NEUTROABS 3.19 10/23/2020    LYMPHSABS 1.28 10/23/2020    MONOSABS 0.41 10/23/2020    EOSABS 0.02 10/23/2020    BASOSABS 0.01 10/23/2020    AUTOIGNUM 0.01 10/18/2018    NRBC 0.0 10/23/2020     TSH Reflex to Free T4  Lab Results   Component Value Date    TSH 1.310 10/23/2020     CMP  Lab Results   Component Value Date    GLUCOSE 86 12/05/2019    BUN 12 12/05/2019    CREATININE 0.63 12/05/2019    EGFRIFNONA 102 12/05/2019    BCR 19.0 12/05/2019    K 4.0 12/05/2019    CO2 24.7 12/05/2019    CALCIUM 9.6 12/05/2019    ALBUMIN 4.90 12/05/2019    AST 22 12/05/2019    ALT 18 12/05/2019      Lipid Panel:  Lab Results   Component Value Date    CHOL 242 (H) 12/05/2019    TRIG 40 12/05/2019     (H) 12/05/2019    VLDL 8 12/05/2019     (H) 12/05/2019              Immunization History   Administered Date(s) Administered   • Flu Vaccine Quad PF >36MO 11/20/2020       Health Maintenance   Topic Date Due   • COLONOSCOPY  1975   • TDAP/TD VACCINES (1 - Tdap) 06/10/1993   • PAP SMEAR  01/01/2019   • INFLUENZA VACCINE  08/01/2020   • LIPID PANEL  12/05/2020       Diagnoses and all orders for this visit:    1. Well adult exam (Primary)  -     Lipid Panel; Future  -     Vitamin  D 25 Hydroxy; Future  -     Comprehensive Metabolic Panel; Future  Return when fasting to check labs.  Patient is unsure of her last Pap smear.  She is in the process of transferring care to a new OB/GYN provider.  2. Pure hypercholesterolemia  -     Lipid Panel; Future  -     Comprehensive Metabolic Panel; Future  Recheck fasting labs.  Not currently on statin.  3. Vitamin D deficiency  -     Vitamin D 25 Hydroxy; Future  Patient currently takes multivitamin supplementation.  Recheck levels.  4. Screening for diabetes mellitus  -     Comprehensive Metabolic Panel; Future    5. Chronic knee pain, unspecified laterality  Discussed with patient quadricep strengthening exercises.  If her pain is not improved would consider referral to PT.  6. Neoplasm, uncertain whether benign or malignant  Discussed with patient new concerning skin finding on anterior chest.  She has seen dermatology in the past and she will contact her for follow-up.  7. Visit for screening mammogram  -     Mammo Screening Digital Tomosynthesis Bilateral With CAD; Future    8. Screening for malignant neoplasm of colon  -     Cologuard - Stool, Per Rectum; Future  Patient elected Cologuard screening over colonoscopy.      Counseled on health maintenance topics and preventative care recommendations: Cervical cancer screening, breast cancer screening, colon cancer screening, exercise, supplements     Return in about 1 year (around 1/8/2022) for Physical.      Electronically signed by Lilly Vazquez MD, 01/08/21, 8:57 AM EST.

## 2021-01-15 ENCOUNTER — APPOINTMENT (OUTPATIENT)
Dept: PREADMISSION TESTING | Facility: HOSPITAL | Age: 46
End: 2021-01-15

## 2021-01-26 ENCOUNTER — TELEPHONE (OUTPATIENT)
Dept: FAMILY MEDICINE CLINIC | Facility: CLINIC | Age: 46
End: 2021-01-26

## 2021-01-26 DIAGNOSIS — E53.8 B12 DEFICIENCY: Primary | ICD-10-CM

## 2021-01-26 NOTE — TELEPHONE ENCOUNTER
Caller: Tamara Richardson    Relationship: Self    Best call back number:[phone]  What orders are you requesting (i.e. lab or imaging): LAB    In what timeframe would the patient need to come in: ASAP    Where will you receive your lab/imaging services: IN OFFICE    Additional notes: NEEDS A CALL BACK REGARDING WHAT LABS ARE ORDERED AND NEEDS DIAGNOSTIC CODE OF DESMOND SO SHE CAN CHECK WITH INSURANCE

## 2021-01-26 NOTE — TELEPHONE ENCOUNTER
Labs ordered 1/8/2021.  Code for Cologuard Z 12.11.  Code for mammogram Z 12.31.  Screening for diabetes code Z 13.1.  Vitamin D deficiency code E 55.9, pure hypercholesterolemia code E 78.00.  Well adult exam Z00.00    See also Future labs that were ordered on 1/8/2021.

## 2021-01-26 NOTE — TELEPHONE ENCOUNTER
Contacted patient and advised her of  [facility]'s message. Pt would like to have her B12 checked when she goes to get labs. She wants to know if [facility] can place an order for that.

## 2021-01-26 NOTE — TELEPHONE ENCOUNTER
I do not have a diagnosis for B12.  What is the symptom her disease she wants me to check the B12 level?

## 2021-01-26 NOTE — TELEPHONE ENCOUNTER
Contacted patient to let her know that [facility] added on the B12 lab, patient was very appreciative for the call back and did not have any additional questions at this time.

## 2021-02-26 ENCOUNTER — LAB (OUTPATIENT)
Dept: LAB | Facility: HOSPITAL | Age: 46
End: 2021-02-26

## 2021-02-26 DIAGNOSIS — E55.9 VITAMIN D DEFICIENCY: ICD-10-CM

## 2021-02-26 DIAGNOSIS — Z00.00 WELL ADULT EXAM: ICD-10-CM

## 2021-02-26 DIAGNOSIS — Z13.1 SCREENING FOR DIABETES MELLITUS: ICD-10-CM

## 2021-02-26 DIAGNOSIS — E78.00 PURE HYPERCHOLESTEROLEMIA: ICD-10-CM

## 2021-02-26 DIAGNOSIS — E53.8 B12 DEFICIENCY: ICD-10-CM

## 2021-02-26 LAB
25(OH)D3 SERPL-MCNC: 48.3 NG/ML (ref 30–100)
ALBUMIN SERPL-MCNC: 4.9 G/DL (ref 3.5–5.2)
ALBUMIN/GLOB SERPL: 1.7 G/DL
ALP SERPL-CCNC: 64 U/L (ref 39–117)
ALT SERPL W P-5'-P-CCNC: 16 U/L (ref 1–33)
ANION GAP SERPL CALCULATED.3IONS-SCNC: 9.9 MMOL/L (ref 5–15)
AST SERPL-CCNC: 22 U/L (ref 1–32)
BILIRUB SERPL-MCNC: 0.5 MG/DL (ref 0–1.2)
BUN SERPL-MCNC: 9 MG/DL (ref 6–20)
BUN/CREAT SERPL: 15.3 (ref 7–25)
CALCIUM SPEC-SCNC: 9.6 MG/DL (ref 8.6–10.5)
CHLORIDE SERPL-SCNC: 102 MMOL/L (ref 98–107)
CHOLEST SERPL-MCNC: 240 MG/DL (ref 0–200)
CO2 SERPL-SCNC: 26.1 MMOL/L (ref 22–29)
CREAT SERPL-MCNC: 0.59 MG/DL (ref 0.57–1)
GFR SERPL CREATININE-BSD FRML MDRD: 110 ML/MIN/1.73
GLOBULIN UR ELPH-MCNC: 2.9 GM/DL
GLUCOSE SERPL-MCNC: 89 MG/DL (ref 65–99)
HDLC SERPL-MCNC: 120 MG/DL (ref 40–60)
LDLC SERPL CALC-MCNC: 111 MG/DL (ref 0–100)
LDLC/HDLC SERPL: 0.91 {RATIO}
POTASSIUM SERPL-SCNC: 4.5 MMOL/L (ref 3.5–5.2)
PROT SERPL-MCNC: 7.8 G/DL (ref 6–8.5)
SODIUM SERPL-SCNC: 138 MMOL/L (ref 136–145)
TRIGL SERPL-MCNC: 56 MG/DL (ref 0–150)
VLDLC SERPL-MCNC: 9 MG/DL (ref 5–40)

## 2021-02-26 PROCEDURE — 80053 COMPREHEN METABOLIC PANEL: CPT

## 2021-02-26 PROCEDURE — 82607 VITAMIN B-12: CPT | Performed by: OBSTETRICS & GYNECOLOGY

## 2021-02-26 PROCEDURE — 82746 ASSAY OF FOLIC ACID SERUM: CPT | Performed by: OBSTETRICS & GYNECOLOGY

## 2021-02-26 PROCEDURE — 82306 VITAMIN D 25 HYDROXY: CPT

## 2021-02-26 PROCEDURE — 80061 LIPID PANEL: CPT

## 2021-02-26 PROCEDURE — 36415 COLL VENOUS BLD VENIPUNCTURE: CPT | Performed by: OBSTETRICS & GYNECOLOGY

## 2021-02-27 LAB
FOLATE SERPL-MCNC: 18.4 NG/ML
VIT B12 SERPL-MCNC: 1234 PG/ML (ref 232–1245)

## 2021-03-26 ENCOUNTER — APPOINTMENT (OUTPATIENT)
Dept: MAMMOGRAPHY | Facility: HOSPITAL | Age: 46
End: 2021-03-26

## 2021-06-04 ENCOUNTER — HOSPITAL ENCOUNTER (OUTPATIENT)
Dept: MAMMOGRAPHY | Facility: HOSPITAL | Age: 46
Discharge: HOME | End: 2021-06-04
Admitting: FAMILY MEDICINE

## 2021-06-04 DIAGNOSIS — Z12.31 VISIT FOR SCREENING MAMMOGRAM: ICD-10-CM

## 2021-06-04 PROCEDURE — 77067 SCR MAMMO BI INCL CAD: CPT

## 2021-06-04 PROCEDURE — 77063 BREAST TOMOSYNTHESIS BI: CPT | Performed by: RADIOLOGY

## 2021-06-04 PROCEDURE — 77067 SCR MAMMO BI INCL CAD: CPT | Performed by: RADIOLOGY

## 2021-06-04 PROCEDURE — 77063 BREAST TOMOSYNTHESIS BI: CPT

## 2021-10-15 ENCOUNTER — OFFICE VISIT (OUTPATIENT)
Dept: ORTHOPEDIC SURGERY | Facility: CLINIC | Age: 46
End: 2021-10-15

## 2021-10-15 VITALS
HEART RATE: 87 BPM | BODY MASS INDEX: 21.07 KG/M2 | SYSTOLIC BLOOD PRESSURE: 128 MMHG | HEIGHT: 64 IN | WEIGHT: 123.4 LBS | DIASTOLIC BLOOD PRESSURE: 86 MMHG

## 2021-10-15 DIAGNOSIS — M79.672 LEFT FOOT PAIN: Primary | ICD-10-CM

## 2021-10-15 DIAGNOSIS — M21.6X1 ACQUIRED METATARSUS VARUS OF RIGHT FOOT: ICD-10-CM

## 2021-10-15 DIAGNOSIS — M21.6X2 ACQUIRED METATARSUS VARUS OF LEFT FOOT: ICD-10-CM

## 2021-10-15 PROCEDURE — 99204 OFFICE O/P NEW MOD 45 MIN: CPT | Performed by: ORTHOPAEDIC SURGERY

## 2021-10-15 NOTE — PROGRESS NOTES
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  The problem started when she jammed her left 2nd toe in January of this year.  She has had pain since in the 2nd MT head area.  She has noted a bump on the dorsal 2nd metatarsal  Head.   She has seen podiatry, I reviewed the notes.  They report she had an injection but she reports she has not had an injection.  Her pain is worse barefoot, better with padding.  She has orthotics but did not bring them with her, she reports they are not comfortable, do not fit in some shoes.  It sounds like they have a hard plastic underlay.  She has a long history of bunions but reports the bunions do not hurt.  The pain is in the 2nd MTPJ, it hurts with dorsiflexion.  She would like to avoid surgery if possible, she asked me if she would ever need surgery- I explained that pain is the only indication.      Current Outpatient Medications on File Prior to Visit   Medication Sig Dispense Refill   • Multiple Vitamins-Minerals (MULTIVITAMIN WITH MINERALS) tablet Take 1 tablet by mouth Daily.       No current facility-administered medications on file prior to visit.      No Known Allergies   Past Medical History:   Diagnosis Date   • B12 deficiency    • Breast lump    • HPV (human papilloma virus) infection    • Hyperlipidemia    • Pericarditis      Past Surgical History:   Procedure Laterality Date   • AUGMENTATION MAMMAPLASTY Bilateral        • REFRACTIVE SURGERY     • WISDOM TOOTH EXTRACTION       Family History   Problem Relation Age of Onset   • Atrial fibrillation Mother    • Hyperlipidemia Mother    • Cancer Father         kidney   • Hyperlipidemia Brother    •  "Allergy (severe) Daughter         treenut   • Arthritis Maternal Grandmother    • Heart disease Other    • Stroke Other    • Breast cancer Neg Hx    • Ovarian cancer Neg Hx    • Diabetes Neg Hx    • Colon cancer Neg Hx       Social History     Socioeconomic History   • Marital status:      Spouse name: Eugenio   • Number of children: 2   Tobacco Use   • Smoking status: Never Smoker   • Smokeless tobacco: Never Used   Vaping Use   • Vaping Use: Never used   Substance and Sexual Activity   • Alcohol use: Yes     Comment: social   • Drug use: No   • Sexual activity: Yes     Partners: Male        Review of Systems   Constitutional: Positive for fatigue.   HENT: Negative.    Eyes: Negative.    Respiratory: Negative.    Cardiovascular: Negative.    Gastrointestinal: Negative.    Endocrine: Positive for cold intolerance.   Genitourinary: Negative.    Musculoskeletal: Positive for arthralgias.   Skin: Negative.    Allergic/Immunologic: Positive for environmental allergies.   Neurological: Negative.    Hematological: Negative.    Psychiatric/Behavioral: Negative.        The following portions of the patient's history were reviewed and updated as appropriate: allergies, current medications, past family history, past medical history, past social history, past surgical history and problem list.    Physical Exam:   /86   Pulse 87   Ht 162.6 cm (64.02\")   Wt 56 kg (123 lb 6.4 oz)   BMI 21.17 kg/m²   GENERAL: Body habitus: normal weight for height    Lower extremity edema: Right: none; Left: none    Varicose veins:  Right: none; Left: none    Gait: normal     Mental Status:  awake and alert; oriented to person, place, and time    Voice:  clear  SKIN:  Lower extremity: Normal    Hair Growth(lower extremity):  Right:normal; Left:  normal  NAILS: Toenails: normal  HEENT: Head: Normocephalic, atraumatic,  without obvious abnormality.  eye: normal external eye, no icterus  ears:normal external ears  PULM:  Repiratory " effort normal  CV:  Dorsalis Pedis:  Right: 2+; Left:2+    Posterior Tibial: Right:2+; Left:2+    Capillary Refill:  Brisk  MSK:  Hand:right handed and sensation intact      Tibia:  Right:  non tender; Left:  non tender      Ankle:  Right: non tender, ROM  normal and motor function  normal; Left:  non tender, ROM  normal and motor function  normal      Foot:  Right:  moderate HV/MTPV but not tender, normal ROM of the great toe. ; Left:  tender in the 2nd MTPJ, dorsal osteophytes on 2nd MTPJ, pain with dorsiflexion of the 2nd toe, only about 30 deg dorsiflexion of the 2nd toe.   no instability, moderate HV/MTPV but not tender over the bunion, normal ROM of 1st MTPJ      NEURO: Heel Walking:  Right:  normal; Left:  normal    Toe Walking:  Right:  normal; Left:  able to do this but pain in the 2nd MTPJ     Grampian-Faith 5.07 monofilament test: normal    Lower extremity sensation: intact         Calf Atrophy:none    Motor Function: all 5/5         Medical Decision Making    Data Review:   ordered and reviewed x-rays today, reviewed radiology images, reviewed radiology results and reviewed outside records    Assessment and Plan/ Diagnosis/Treatment options:   1. 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  I explained that this is not common.  I have seen it more often in women, but again it is fairly rare.  I explained that it might be due to the injury, also might be idiopathic.   I explained that we do not make cartilage as adults, when it begins to wear out (whether due to injury or genetics or other) it continues to wear out.  The boy responds by making osteophytes, subchondral cystic change, etc.  (I explained the cysts are not like a ganglion cyst.  They are degenerative areas, cannot be removed like a ganglion)  I explained the ostephytes are not the source of pain, but rather a marker that indicates the joint has lost cartilage.  We discussed options.  I recommend better orthotics and gave  her a prescription.  I also showed her how to use a metatarsal pad in her shoe.  She  Has birkenstocks today, and the stiffness and metatarsal posting they have will help the 2nd toe pain.  We discussed injection, she does not think it is painful enough right now.  We discussed activity- avoiding being up on the toes.  We also discussed shoe wear, I recommend she only wear flat shoes.  I went over all in detail, I will be happy to see her any time.       - XR Foot 2 View Left    2. Acquired metatarsus varus of right foot  Not painful, I explained my only indication for surgery is pain.     3. Acquired metatarsus varus of left foot  Not painful, I explained my only indication for surgery is pain.             Radiology Ordered []  Radiology Reports Reviewed []      Radiology Images Reviewed []   Labs Reviewed []    Labs Ordered []   PCP Records Reviewed []    Provider Records Reviewed []    ER Records Reviewed []    Hospital Records Reviewed []    History Obtained From Family []    Phone conversation with Provider []    Records Requested []        Leah Patel MD

## 2022-05-02 ENCOUNTER — PRE-ADMISSION TESTING (OUTPATIENT)
Dept: PREADMISSION TESTING | Facility: HOSPITAL | Age: 47
End: 2022-05-02

## 2022-05-02 VITALS — BODY MASS INDEX: 21.34 KG/M2 | WEIGHT: 125 LBS | HEIGHT: 64 IN

## 2022-05-02 LAB
ABO GROUP BLD: NORMAL
ANION GAP SERPL CALCULATED.3IONS-SCNC: 11 MMOL/L (ref 5–15)
B-HCG UR QL: NEGATIVE
BLD GP AB SCN SERPL QL: NEGATIVE
BUN SERPL-MCNC: 8 MG/DL (ref 6–20)
BUN/CREAT SERPL: 13.3 (ref 7–25)
CALCIUM SPEC-SCNC: 10.3 MG/DL (ref 8.6–10.5)
CHLORIDE SERPL-SCNC: 101 MMOL/L (ref 98–107)
CO2 SERPL-SCNC: 25 MMOL/L (ref 22–29)
CREAT SERPL-MCNC: 0.6 MG/DL (ref 0.57–1)
DEPRECATED RDW RBC AUTO: 43.7 FL (ref 37–54)
EGFRCR SERPLBLD CKD-EPI 2021: 111.6 ML/MIN/1.73
ERYTHROCYTE [DISTWIDTH] IN BLOOD BY AUTOMATED COUNT: 14.9 % (ref 12.3–15.4)
GLUCOSE SERPL-MCNC: 95 MG/DL (ref 65–99)
HBA1C MFR BLD: 4.6 % (ref 4.8–5.6)
HCT VFR BLD AUTO: 34.6 % (ref 34–46.6)
HGB BLD-MCNC: 10.9 G/DL (ref 12–15.9)
MCH RBC QN AUTO: 25.3 PG (ref 26.6–33)
MCHC RBC AUTO-ENTMCNC: 31.5 G/DL (ref 31.5–35.7)
MCV RBC AUTO: 80.3 FL (ref 79–97)
PLATELET # BLD AUTO: 423 10*3/MM3 (ref 140–450)
PMV BLD AUTO: 10 FL (ref 6–12)
POTASSIUM SERPL-SCNC: 4 MMOL/L (ref 3.5–5.2)
RBC # BLD AUTO: 4.31 10*6/MM3 (ref 3.77–5.28)
RH BLD: POSITIVE
SARS-COV-2 RNA PNL SPEC NAA+PROBE: NOT DETECTED
SODIUM SERPL-SCNC: 137 MMOL/L (ref 136–145)
T&S EXPIRATION DATE: NORMAL
WBC NRBC COR # BLD: 5.21 10*3/MM3 (ref 3.4–10.8)

## 2022-05-02 PROCEDURE — U0004 COV-19 TEST NON-CDC HGH THRU: HCPCS

## 2022-05-02 PROCEDURE — 80048 BASIC METABOLIC PNL TOTAL CA: CPT

## 2022-05-02 PROCEDURE — 36415 COLL VENOUS BLD VENIPUNCTURE: CPT

## 2022-05-02 PROCEDURE — 83036 HEMOGLOBIN GLYCOSYLATED A1C: CPT

## 2022-05-02 PROCEDURE — 85027 COMPLETE CBC AUTOMATED: CPT

## 2022-05-02 PROCEDURE — 86901 BLOOD TYPING SEROLOGIC RH(D): CPT

## 2022-05-02 PROCEDURE — 81025 URINE PREGNANCY TEST: CPT

## 2022-05-02 PROCEDURE — C9803 HOPD COVID-19 SPEC COLLECT: HCPCS

## 2022-05-02 PROCEDURE — 86900 BLOOD TYPING SEROLOGIC ABO: CPT

## 2022-05-02 PROCEDURE — 86850 RBC ANTIBODY SCREEN: CPT

## 2022-05-02 NOTE — DISCHARGE INSTRUCTIONS
The following information and instructions were given:    Do not eat, drink, smoke or chew gum after midnight the night before surgery. This includes no mints.  Take all routine, prescribed medications including heart and blood pressure medicines with a sip of water unless otherwise instructed by your physician.   Do NOT take diabetic medication unless instructed by your physician.    DO NOT shave for two days before your procedure.  Do not wear makeup.      DO NOT wear fingernail polish (gel/regular) and/or acrylic/artificial nails on the day of surgery.   If you had a recent manicure and would rather not remove polish or artificial nails, the minimum requirement is that the polish/artificial nails must be removed from the middle finger on each hand.      If you are having surgery/procedure on an upper extremity, fingernail polish/artificial fingernails must be removed for surgery.  NO EXCEPTIONS.      If you are having surgery/procedure on a lower extremity, toenail polish on both extremities must be removed for surgery.  NO EXCEPTIONS.    Remove all jewelry (advise to go to jeweler if unable to remove).  Jewelry, especially rings, can no longer be taped for surgery.    Leave anything you consider valuable at home.    Leave your suitcase in the car until after your surgery.    Bring the following with you the day of your procedure (when applicable):       -Picture ID and insurance cards       -Co-pay/deductible required by insurance       -Medications in the original bottles (not a list) including all over-the-counter medications if not brought to PAT       -Copy of advance directive, living will or power of  documents if not brought to PAT       -CPAP or BIPAP mask and tubing (do not bring machine)       -Skin prep instruction(s) sheet       -PAT Pass    Education booklet (when applicable), brochure, handout or binder related to procedure given to patient.  Education booklet also includes general  information related to their recovery that mentions signs and symptoms of infection and when to call the doctor.    When applicable, an ERAS handout was given to patient.    Respirex use and pneumonia prevention education provided in Pre Admission Testing general education video.    Signs and Symptoms of infection discussed with patient in Pre Admission Testing. Information related to infection and hand hygiene mentioned in Pre Admission Testing general education video. Patient instructed to call their doctor if any of the following symptoms are noted during recovery:  Fever of 100.4 F or higher, incision that is warm or has increasing bleeding, redness or drainage.    DVT Prevention instructions given in general education video presentation during Pre Admission Testing appointment that stress the importance of ambulation to improve blood circulation.  Also encouraged patient to perform foot exercises when in bed and application of a sequential device may be applied to lower extremities to improve circulation.      Please apply Chlorhexidine wipes to surgical area (if instructed) the night before procedure and the AM of procedure and document date/time of applications on skin prep instruction sheet.

## 2022-05-02 NOTE — PAT
An arrival time for procedure was not given during PAT visit. If patient had any questions or concerns about their arrival time, they were instructed to contact their surgeon/physician.  Additionally, if the patient referred to an arrival time that was acquired from their my chart account, patient was encouraged to verify that time with their surgeon/physician.  NO arrival times given in Pre Admission Testing Department.    Patient to apply Chlorhexadine wipes  to surgical area (as instructed) the night before procedure and the AM of procedure. Wipes provided.    Patient viewed general PAT education video as instructed in their preoperative information received from their surgeon.  Patient stated the general PAT education video was viewed in its entirety and survey completed.  Copies of PAT general education handouts (Incentive Spirometry, Meds to Beds Program, Patient Belongings, Pre-op skin preparation instructions, Blood Glucose testing, Visitor policy, Surgery FAQ, Code H) distributed to patient if not printed. Education related to the PAT pass and skin preparation for surgery (if applicable) completed in PAT as a reinforcement to PAT education video. Patient instructed to return PAT pass provided today as well as completed skin preparation sheet (if applicable) on the day of procedure.     Additionally if patient had not viewed video yet but intended to view it at home or in our waiting area, then referred them to the handout with QR code/link provided during PAT visit.  Instructed patient to complete survey after viewing the video in its entirety.  Encouraged patient/family to read PAT general education handouts thoroughly and notify PAT staff with any questions or concerns. Patient verbalized understanding of all information and priority content.    Blood bank bracelet applied to patient during Pre Admission Testing visit.  Patient instructed not to remove from arm until after procedure and they are  discharged from the hospital.  Explained to patient that they may shower and get the bracelet wet, but not to immerse under water for longer periods (bathing, swimming, hand dishwashing, etc).  Patient verbalized understanding.    COVID test collected in [facility].

## 2022-05-04 ENCOUNTER — ANESTHESIA EVENT CONVERTED (OUTPATIENT)
Dept: ANESTHESIOLOGY | Facility: HOSPITAL | Age: 47
End: 2022-05-04

## 2022-05-04 ENCOUNTER — ANESTHESIA EVENT (OUTPATIENT)
Dept: PERIOP | Facility: HOSPITAL | Age: 47
End: 2022-05-04

## 2022-05-04 ENCOUNTER — HOSPITAL ENCOUNTER (INPATIENT)
Facility: HOSPITAL | Age: 47
LOS: 2 days | Discharge: HOME | End: 2022-05-06
Attending: OBSTETRICS & GYNECOLOGY | Admitting: OBSTETRICS & GYNECOLOGY

## 2022-05-04 ENCOUNTER — ANESTHESIA (OUTPATIENT)
Dept: PERIOP | Facility: HOSPITAL | Age: 47
End: 2022-05-04

## 2022-05-04 DIAGNOSIS — N92.0 MENORRHAGIA: ICD-10-CM

## 2022-05-04 DIAGNOSIS — D21.9 FIBROIDS: ICD-10-CM

## 2022-05-04 DIAGNOSIS — D64.9 ANEMIA: ICD-10-CM

## 2022-05-04 PROBLEM — Z90.710 S/P ABDOMINAL HYSTERECTOMY: Status: RESOLVED | Noted: 2022-05-04 | Resolved: 2022-05-04

## 2022-05-04 PROBLEM — Z90.710 S/P ABDOMINAL HYSTERECTOMY: Status: ACTIVE | Noted: 2022-05-04

## 2022-05-04 LAB
B-HCG UR QL: NEGATIVE
EXPIRATION DATE: NORMAL
INTERNAL NEGATIVE CONTROL: NORMAL
INTERNAL POSITIVE CONTROL: NORMAL
Lab: NORMAL

## 2022-05-04 PROCEDURE — 25010000002 ONDANSETRON PER 1 MG: Performed by: NURSE ANESTHETIST, CERTIFIED REGISTERED

## 2022-05-04 PROCEDURE — 25010000002 PROPOFOL 10 MG/ML EMULSION: Performed by: NURSE ANESTHETIST, CERTIFIED REGISTERED

## 2022-05-04 PROCEDURE — 25010000002 ALBUMIN HUMAN 5% PER 50 ML: Performed by: NURSE ANESTHETIST, CERTIFIED REGISTERED

## 2022-05-04 PROCEDURE — 25010000002 HYDROMORPHONE 1 MG/ML SOLUTION

## 2022-05-04 PROCEDURE — 25010000002 DEXAMETHASONE PER 1 MG: Performed by: NURSE ANESTHETIST, CERTIFIED REGISTERED

## 2022-05-04 PROCEDURE — 25010000002 NEOSTIGMINE 10 MG/10ML SOLUTION: Performed by: NURSE ANESTHETIST, CERTIFIED REGISTERED

## 2022-05-04 PROCEDURE — 25010000002 MIDAZOLAM PER 1 MG: Performed by: ANESTHESIOLOGY

## 2022-05-04 PROCEDURE — 25010000002 KETOROLAC TROMETHAMINE PER 15 MG: Performed by: OBSTETRICS & GYNECOLOGY

## 2022-05-04 PROCEDURE — P9041 ALBUMIN (HUMAN),5%, 50ML: HCPCS | Performed by: NURSE ANESTHETIST, CERTIFIED REGISTERED

## 2022-05-04 PROCEDURE — 25010000002 HEPARIN (PORCINE) PER 1000 UNITS: Performed by: OBSTETRICS & GYNECOLOGY

## 2022-05-04 PROCEDURE — 0UT90ZL RESECTION OF UTERUS, SUPRACERVICAL, OPEN APPROACH: ICD-10-PCS | Performed by: OBSTETRICS & GYNECOLOGY

## 2022-05-04 PROCEDURE — 25010000002 FENTANYL CITRATE (PF) 50 MCG/ML SOLUTION: Performed by: NURSE ANESTHETIST, CERTIFIED REGISTERED

## 2022-05-04 PROCEDURE — 88307 TISSUE EXAM BY PATHOLOGIST: CPT | Performed by: OBSTETRICS & GYNECOLOGY

## 2022-05-04 PROCEDURE — 25010000002 CEFAZOLIN IN DEXTROSE 2-4 GM/100ML-% SOLUTION: Performed by: OBSTETRICS & GYNECOLOGY

## 2022-05-04 PROCEDURE — 25010000002 FENTANYL CITRATE (PF) 50 MCG/ML SOLUTION

## 2022-05-04 PROCEDURE — 0UT70ZZ RESECTION OF BILATERAL FALLOPIAN TUBES, OPEN APPROACH: ICD-10-PCS | Performed by: OBSTETRICS & GYNECOLOGY

## 2022-05-04 PROCEDURE — 25010000002 DEXAMETHASONE SODIUM PHOSPHATE 10 MG/ML SOLUTION: Performed by: NURSE ANESTHETIST, CERTIFIED REGISTERED

## 2022-05-04 PROCEDURE — 81025 URINE PREGNANCY TEST: CPT | Performed by: OBSTETRICS & GYNECOLOGY

## 2022-05-04 DEVICE — HEMOST ABS SURGICEL PWDR 3GM: Type: IMPLANTABLE DEVICE | Site: ABDOMEN | Status: FUNCTIONAL

## 2022-05-04 RX ORDER — ONDANSETRON 4 MG/1
4 TABLET, FILM COATED ORAL EVERY 6 HOURS PRN
Status: DISCONTINUED | OUTPATIENT
Start: 2022-05-04 | End: 2022-05-06 | Stop reason: HOSPADM

## 2022-05-04 RX ORDER — DEXAMETHASONE SODIUM PHOSPHATE 4 MG/ML
INJECTION, SOLUTION INTRA-ARTICULAR; INTRALESIONAL; INTRAMUSCULAR; INTRAVENOUS; SOFT TISSUE AS NEEDED
Status: DISCONTINUED | OUTPATIENT
Start: 2022-05-04 | End: 2022-05-04 | Stop reason: SURG

## 2022-05-04 RX ORDER — GLYCOPYRROLATE 0.2 MG/ML
INJECTION INTRAMUSCULAR; INTRAVENOUS AS NEEDED
Status: DISCONTINUED | OUTPATIENT
Start: 2022-05-04 | End: 2022-05-04 | Stop reason: SURG

## 2022-05-04 RX ORDER — NALOXONE HCL 0.4 MG/ML
0.4 VIAL (ML) INJECTION
Status: DISCONTINUED | OUTPATIENT
Start: 2022-05-04 | End: 2022-05-04

## 2022-05-04 RX ORDER — HYDROCODONE BITARTRATE AND ACETAMINOPHEN 5; 325 MG/1; MG/1
1 TABLET ORAL ONCE AS NEEDED
Status: DISCONTINUED | OUTPATIENT
Start: 2022-05-04 | End: 2022-05-04

## 2022-05-04 RX ORDER — ACETAMINOPHEN 500 MG
1000 TABLET ORAL ONCE
Status: COMPLETED | OUTPATIENT
Start: 2022-05-04 | End: 2022-05-04

## 2022-05-04 RX ORDER — NALOXONE HCL 0.4 MG/ML
0.4 VIAL (ML) INJECTION AS NEEDED
Status: DISCONTINUED | OUTPATIENT
Start: 2022-05-04 | End: 2022-05-04

## 2022-05-04 RX ORDER — DROPERIDOL 2.5 MG/ML
0.62 INJECTION, SOLUTION INTRAMUSCULAR; INTRAVENOUS ONCE AS NEEDED
Status: DISCONTINUED | OUTPATIENT
Start: 2022-05-04 | End: 2022-05-04

## 2022-05-04 RX ORDER — SODIUM CHLORIDE 0.9 % (FLUSH) 0.9 %
10 SYRINGE (ML) INJECTION AS NEEDED
Status: CANCELLED | OUTPATIENT
Start: 2022-05-04

## 2022-05-04 RX ORDER — PROMETHAZINE HYDROCHLORIDE 25 MG/1
25 TABLET ORAL ONCE AS NEEDED
Status: DISCONTINUED | OUTPATIENT
Start: 2022-05-04 | End: 2022-05-04

## 2022-05-04 RX ORDER — FENTANYL CITRATE 50 UG/ML
INJECTION, SOLUTION INTRAMUSCULAR; INTRAVENOUS AS NEEDED
Status: DISCONTINUED | OUTPATIENT
Start: 2022-05-04 | End: 2022-05-04 | Stop reason: SURG

## 2022-05-04 RX ORDER — HYDROMORPHONE HYDROCHLORIDE 1 MG/ML
0.5 INJECTION, SOLUTION INTRAMUSCULAR; INTRAVENOUS; SUBCUTANEOUS
Status: DISCONTINUED | OUTPATIENT
Start: 2022-05-04 | End: 2022-05-04

## 2022-05-04 RX ORDER — HEPARIN SODIUM 5000 [USP'U]/ML
5000 INJECTION, SOLUTION INTRAVENOUS; SUBCUTANEOUS ONCE
Status: DISCONTINUED | OUTPATIENT
Start: 2022-05-04 | End: 2022-05-04 | Stop reason: HOSPADM

## 2022-05-04 RX ORDER — HEPARIN SODIUM 5000 [USP'U]/ML
INJECTION, SOLUTION INTRAVENOUS; SUBCUTANEOUS AS NEEDED
Status: DISCONTINUED | OUTPATIENT
Start: 2022-05-04 | End: 2022-05-04 | Stop reason: HOSPADM

## 2022-05-04 RX ORDER — MAGNESIUM HYDROXIDE 1200 MG/15ML
LIQUID ORAL AS NEEDED
Status: DISCONTINUED | OUTPATIENT
Start: 2022-05-04 | End: 2022-05-04 | Stop reason: HOSPADM

## 2022-05-04 RX ORDER — MEPERIDINE HYDROCHLORIDE 25 MG/ML
12.5 INJECTION INTRAMUSCULAR; INTRAVENOUS; SUBCUTANEOUS
Status: DISCONTINUED | OUTPATIENT
Start: 2022-05-04 | End: 2022-05-04

## 2022-05-04 RX ORDER — TEMAZEPAM 15 MG/1
15 CAPSULE ORAL NIGHTLY PRN
Status: DISCONTINUED | OUTPATIENT
Start: 2022-05-04 | End: 2022-05-06 | Stop reason: HOSPADM

## 2022-05-04 RX ORDER — NALOXONE HYDROCHLORIDE 0.4 MG/ML
0.4 INJECTION, SOLUTION INTRAMUSCULAR; INTRAVENOUS; SUBCUTANEOUS
Status: DISCONTINUED | OUTPATIENT
Start: 2022-05-04 | End: 2022-05-06 | Stop reason: HOSPADM

## 2022-05-04 RX ORDER — SODIUM CHLORIDE 0.9 % (FLUSH) 0.9 %
3 SYRINGE (ML) INJECTION EVERY 12 HOURS SCHEDULED
Status: DISCONTINUED | OUTPATIENT
Start: 2022-05-04 | End: 2022-05-04

## 2022-05-04 RX ORDER — BUPIVACAINE HCL/0.9 % NACL/PF 0.125 %
PLASTIC BAG, INJECTION (ML) EPIDURAL AS NEEDED
Status: DISCONTINUED | OUTPATIENT
Start: 2022-05-04 | End: 2022-05-04 | Stop reason: SURG

## 2022-05-04 RX ORDER — PHENAZOPYRIDINE HYDROCHLORIDE 100 MG/1
200 TABLET, FILM COATED ORAL ONCE
Status: COMPLETED | OUTPATIENT
Start: 2022-05-04 | End: 2022-05-04

## 2022-05-04 RX ORDER — SODIUM CHLORIDE 0.9 % (FLUSH) 0.9 %
10 SYRINGE (ML) INJECTION EVERY 12 HOURS SCHEDULED
Status: CANCELLED | OUTPATIENT
Start: 2022-05-04

## 2022-05-04 RX ORDER — LIDOCAINE HYDROCHLORIDE 10 MG/ML
INJECTION, SOLUTION EPIDURAL; INFILTRATION; INTRACAUDAL; PERINEURAL AS NEEDED
Status: DISCONTINUED | OUTPATIENT
Start: 2022-05-04 | End: 2022-05-04 | Stop reason: SURG

## 2022-05-04 RX ORDER — HYDROCODONE BITARTRATE AND ACETAMINOPHEN 7.5; 325 MG/1; MG/1
1 TABLET ORAL EVERY 4 HOURS PRN
Status: DISCONTINUED | OUTPATIENT
Start: 2022-05-04 | End: 2022-05-05

## 2022-05-04 RX ORDER — ROCURONIUM BROMIDE 10 MG/ML
INJECTION, SOLUTION INTRAVENOUS AS NEEDED
Status: DISCONTINUED | OUTPATIENT
Start: 2022-05-04 | End: 2022-05-04 | Stop reason: SURG

## 2022-05-04 RX ORDER — MIDAZOLAM HYDROCHLORIDE 1 MG/ML
1 INJECTION INTRAMUSCULAR; INTRAVENOUS
Status: DISCONTINUED | OUTPATIENT
Start: 2022-05-04 | End: 2022-05-04 | Stop reason: HOSPADM

## 2022-05-04 RX ORDER — PROPOFOL 10 MG/ML
VIAL (ML) INTRAVENOUS AS NEEDED
Status: DISCONTINUED | OUTPATIENT
Start: 2022-05-04 | End: 2022-05-04 | Stop reason: SURG

## 2022-05-04 RX ORDER — KETOROLAC TROMETHAMINE 15 MG/ML
15 INJECTION, SOLUTION INTRAMUSCULAR; INTRAVENOUS EVERY 6 HOURS
Status: COMPLETED | OUTPATIENT
Start: 2022-05-04 | End: 2022-05-04

## 2022-05-04 RX ORDER — EPHEDRINE SULFATE 50 MG/ML
INJECTION, SOLUTION INTRAVENOUS AS NEEDED
Status: DISCONTINUED | OUTPATIENT
Start: 2022-05-04 | End: 2022-05-04 | Stop reason: SURG

## 2022-05-04 RX ORDER — SODIUM CHLORIDE 0.9 % (FLUSH) 0.9 %
3-10 SYRINGE (ML) INJECTION AS NEEDED
Status: DISCONTINUED | OUTPATIENT
Start: 2022-05-04 | End: 2022-05-04

## 2022-05-04 RX ORDER — CEFAZOLIN SODIUM 2 G/100ML
2 INJECTION, SOLUTION INTRAVENOUS ONCE
Status: COMPLETED | OUTPATIENT
Start: 2022-05-04 | End: 2022-05-04

## 2022-05-04 RX ORDER — ONDANSETRON 2 MG/ML
4 INJECTION INTRAMUSCULAR; INTRAVENOUS EVERY 6 HOURS PRN
Status: DISCONTINUED | OUTPATIENT
Start: 2022-05-04 | End: 2022-05-06 | Stop reason: HOSPADM

## 2022-05-04 RX ORDER — DEXAMETHASONE SODIUM PHOSPHATE 10 MG/ML
INJECTION, SOLUTION INTRAMUSCULAR; INTRAVENOUS
Status: COMPLETED | OUTPATIENT
Start: 2022-05-04 | End: 2022-05-04

## 2022-05-04 RX ORDER — PROMETHAZINE HYDROCHLORIDE 25 MG/1
25 SUPPOSITORY RECTAL ONCE AS NEEDED
Status: DISCONTINUED | OUTPATIENT
Start: 2022-05-04 | End: 2022-05-04

## 2022-05-04 RX ORDER — ALBUMIN, HUMAN INJ 5% 5 %
SOLUTION INTRAVENOUS CONTINUOUS PRN
Status: DISCONTINUED | OUTPATIENT
Start: 2022-05-04 | End: 2022-05-04 | Stop reason: SURG

## 2022-05-04 RX ORDER — NEOSTIGMINE METHYLSULFATE 1 MG/ML
INJECTION, SOLUTION INTRAVENOUS AS NEEDED
Status: DISCONTINUED | OUTPATIENT
Start: 2022-05-04 | End: 2022-05-04 | Stop reason: SURG

## 2022-05-04 RX ORDER — CEFAZOLIN SODIUM 2 G/100ML
2 INJECTION, SOLUTION INTRAVENOUS EVERY 8 HOURS
Status: COMPLETED | OUTPATIENT
Start: 2022-05-04 | End: 2022-05-05

## 2022-05-04 RX ORDER — FENTANYL CITRATE 50 UG/ML
INJECTION, SOLUTION INTRAMUSCULAR; INTRAVENOUS
Status: DISPENSED
Start: 2022-05-04 | End: 2022-05-05

## 2022-05-04 RX ORDER — BUPIVACAINE HYDROCHLORIDE 2.5 MG/ML
INJECTION, SOLUTION EPIDURAL; INFILTRATION; INTRACAUDAL
Status: COMPLETED | OUTPATIENT
Start: 2022-05-04 | End: 2022-05-04

## 2022-05-04 RX ORDER — POLYETHYLENE GLYCOL 3350 17 G/17G
17 POWDER, FOR SOLUTION ORAL DAILY PRN
Status: DISCONTINUED | OUTPATIENT
Start: 2022-05-04 | End: 2022-05-06 | Stop reason: HOSPADM

## 2022-05-04 RX ORDER — LIDOCAINE HYDROCHLORIDE 10 MG/ML
0.5 INJECTION, SOLUTION EPIDURAL; INFILTRATION; INTRACAUDAL; PERINEURAL ONCE AS NEEDED
Status: COMPLETED | OUTPATIENT
Start: 2022-05-04 | End: 2022-05-04

## 2022-05-04 RX ORDER — FAMOTIDINE 20 MG/1
20 TABLET, FILM COATED ORAL
Status: COMPLETED | OUTPATIENT
Start: 2022-05-04 | End: 2022-05-04

## 2022-05-04 RX ORDER — FENTANYL CITRATE 50 UG/ML
50 INJECTION, SOLUTION INTRAMUSCULAR; INTRAVENOUS
Status: DISCONTINUED | OUTPATIENT
Start: 2022-05-04 | End: 2022-05-04

## 2022-05-04 RX ORDER — IPRATROPIUM BROMIDE AND ALBUTEROL SULFATE 2.5; .5 MG/3ML; MG/3ML
3 SOLUTION RESPIRATORY (INHALATION) ONCE AS NEEDED
Status: DISCONTINUED | OUTPATIENT
Start: 2022-05-04 | End: 2022-05-04

## 2022-05-04 RX ORDER — FERROUS SULFATE 325(65) MG
325 TABLET ORAL DAILY
COMMUNITY
End: 2022-11-01

## 2022-05-04 RX ORDER — MORPHINE SULFATE 2 MG/ML
1 INJECTION, SOLUTION INTRAMUSCULAR; INTRAVENOUS EVERY 4 HOURS PRN
Status: DISCONTINUED | OUTPATIENT
Start: 2022-05-04 | End: 2022-05-06 | Stop reason: HOSPADM

## 2022-05-04 RX ORDER — ONDANSETRON 2 MG/ML
INJECTION INTRAMUSCULAR; INTRAVENOUS AS NEEDED
Status: DISCONTINUED | OUTPATIENT
Start: 2022-05-04 | End: 2022-05-04 | Stop reason: SURG

## 2022-05-04 RX ORDER — SODIUM CHLORIDE, SODIUM LACTATE, POTASSIUM CHLORIDE, CALCIUM CHLORIDE 600; 310; 30; 20 MG/100ML; MG/100ML; MG/100ML; MG/100ML
9 INJECTION, SOLUTION INTRAVENOUS CONTINUOUS PRN
Status: DISCONTINUED | OUTPATIENT
Start: 2022-05-04 | End: 2022-05-06 | Stop reason: HOSPADM

## 2022-05-04 RX ORDER — DROPERIDOL 2.5 MG/ML
0.62 INJECTION, SOLUTION INTRAMUSCULAR; INTRAVENOUS AS NEEDED
Status: DISCONTINUED | OUTPATIENT
Start: 2022-05-04 | End: 2022-05-04

## 2022-05-04 RX ORDER — HYDRALAZINE HYDROCHLORIDE 20 MG/ML
5 INJECTION INTRAMUSCULAR; INTRAVENOUS
Status: DISCONTINUED | OUTPATIENT
Start: 2022-05-04 | End: 2022-05-04

## 2022-05-04 RX ORDER — SIMETHICONE 80 MG
80 TABLET,CHEWABLE ORAL 4 TIMES DAILY PRN
Status: DISCONTINUED | OUTPATIENT
Start: 2022-05-04 | End: 2022-05-06 | Stop reason: HOSPADM

## 2022-05-04 RX ORDER — LABETALOL HYDROCHLORIDE 5 MG/ML
5 INJECTION, SOLUTION INTRAVENOUS
Status: DISCONTINUED | OUTPATIENT
Start: 2022-05-04 | End: 2022-05-04

## 2022-05-04 RX ORDER — MORPHINE SULFATE 1 MG/ML
1 INJECTION, SOLUTION EPIDURAL; INTRATHECAL; INTRAVENOUS EVERY 4 HOURS PRN
Status: DISCONTINUED | OUTPATIENT
Start: 2022-05-04 | End: 2022-05-04

## 2022-05-04 RX ORDER — ONDANSETRON 2 MG/ML
4 INJECTION INTRAMUSCULAR; INTRAVENOUS ONCE AS NEEDED
Status: DISCONTINUED | OUTPATIENT
Start: 2022-05-04 | End: 2022-05-04

## 2022-05-04 RX ORDER — HEPARIN SODIUM 5000 [USP'U]/ML
5000 INJECTION, SOLUTION INTRAVENOUS; SUBCUTANEOUS EVERY 12 HOURS SCHEDULED
Status: DISCONTINUED | OUTPATIENT
Start: 2022-05-05 | End: 2022-05-06 | Stop reason: HOSPADM

## 2022-05-04 RX ADMIN — FAMOTIDINE 20 MG: 20 TABLET ORAL at 12:12

## 2022-05-04 RX ADMIN — HYDROMORPHONE HYDROCHLORIDE 0.5 MG: 1 INJECTION, SOLUTION INTRAMUSCULAR; INTRAVENOUS; SUBCUTANEOUS at 16:57

## 2022-05-04 RX ADMIN — KETOROLAC TROMETHAMINE 15 MG: 15 INJECTION, SOLUTION INTRAMUSCULAR; INTRAVENOUS at 17:59

## 2022-05-04 RX ADMIN — ACETAMINOPHEN 1000 MG: 500 TABLET ORAL at 12:12

## 2022-05-04 RX ADMIN — NEOSTIGMINE METHYLSULFATE 3 MG: 0.5 INJECTION INTRAVENOUS at 15:48

## 2022-05-04 RX ADMIN — FENTANYL CITRATE 50 MCG: 50 INJECTION, SOLUTION INTRAMUSCULAR; INTRAVENOUS at 15:50

## 2022-05-04 RX ADMIN — PHENAZOPYRIDINE HYDROCHLORIDE 200 MG: 100 TABLET ORAL at 12:12

## 2022-05-04 RX ADMIN — CEFAZOLIN SODIUM 2 G: 2 INJECTION, SOLUTION INTRAVENOUS at 13:54

## 2022-05-04 RX ADMIN — CEFAZOLIN SODIUM 2 G: 2 INJECTION, SOLUTION INTRAVENOUS at 22:14

## 2022-05-04 RX ADMIN — FENTANYL CITRATE 100 MCG: 50 INJECTION, SOLUTION INTRAMUSCULAR; INTRAVENOUS at 13:50

## 2022-05-04 RX ADMIN — ONDANSETRON 4 MG: 2 INJECTION INTRAMUSCULAR; INTRAVENOUS at 15:45

## 2022-05-04 RX ADMIN — EPHEDRINE SULFATE 5 MG: 50 INJECTION INTRAVENOUS at 14:34

## 2022-05-04 RX ADMIN — DEXAMETHASONE SODIUM PHOSPHATE 8 MG: 4 INJECTION, SOLUTION INTRA-ARTICULAR; INTRALESIONAL; INTRAMUSCULAR; INTRAVENOUS; SOFT TISSUE at 13:54

## 2022-05-04 RX ADMIN — MIDAZOLAM HYDROCHLORIDE 1 MG: 1 INJECTION, SOLUTION INTRAMUSCULAR; INTRAVENOUS at 12:35

## 2022-05-04 RX ADMIN — SODIUM CHLORIDE, POTASSIUM CHLORIDE, SODIUM LACTATE AND CALCIUM CHLORIDE: 600; 310; 30; 20 INJECTION, SOLUTION INTRAVENOUS at 15:38

## 2022-05-04 RX ADMIN — GLYCOPYRROLATE 0.4 MG: 0.2 INJECTION INTRAMUSCULAR; INTRAVENOUS at 15:48

## 2022-05-04 RX ADMIN — LIDOCAINE HYDROCHLORIDE 0.5 ML: 10 INJECTION, SOLUTION EPIDURAL; INFILTRATION; INTRACAUDAL; PERINEURAL at 11:54

## 2022-05-04 RX ADMIN — ROCURONIUM BROMIDE 20 MG: 10 INJECTION, SOLUTION INTRAVENOUS at 14:30

## 2022-05-04 RX ADMIN — PROPOFOL 25 MCG/KG/MIN: 10 INJECTION, EMULSION INTRAVENOUS at 13:53

## 2022-05-04 RX ADMIN — HYDROCODONE BITARTRATE AND ACETAMINOPHEN 1 TABLET: 7.5; 325 TABLET ORAL at 19:35

## 2022-05-04 RX ADMIN — KETOROLAC TROMETHAMINE 15 MG: 15 INJECTION, SOLUTION INTRAMUSCULAR; INTRAVENOUS at 23:22

## 2022-05-04 RX ADMIN — HYDROMORPHONE HYDROCHLORIDE 0.5 MG: 1 INJECTION, SOLUTION INTRAMUSCULAR; INTRAVENOUS; SUBCUTANEOUS at 16:37

## 2022-05-04 RX ADMIN — PROPOFOL 200 MG: 10 INJECTION, EMULSION INTRAVENOUS at 13:50

## 2022-05-04 RX ADMIN — BUPIVACAINE HYDROCHLORIDE 50 ML: 2.5 INJECTION, SOLUTION EPIDURAL; INFILTRATION; INTRACAUDAL at 13:57

## 2022-05-04 RX ADMIN — ALBUMIN HUMAN: 0.05 INJECTION, SOLUTION INTRAVENOUS at 15:06

## 2022-05-04 RX ADMIN — LIDOCAINE HYDROCHLORIDE 50 MG: 10 INJECTION, SOLUTION EPIDURAL; INFILTRATION; INTRACAUDAL; PERINEURAL at 13:50

## 2022-05-04 RX ADMIN — DEXAMETHASONE SODIUM PHOSPHATE 4 MG: 10 INJECTION, SOLUTION INTRAMUSCULAR; INTRAVENOUS at 13:57

## 2022-05-04 RX ADMIN — FENTANYL CITRATE 50 MCG: 50 INJECTION, SOLUTION INTRAMUSCULAR; INTRAVENOUS at 15:53

## 2022-05-04 RX ADMIN — HYDROCODONE BITARTRATE AND ACETAMINOPHEN 1 TABLET: 7.5; 325 TABLET ORAL at 23:22

## 2022-05-04 RX ADMIN — Medication 50 MCG: at 14:44

## 2022-05-04 RX ADMIN — SODIUM CHLORIDE, POTASSIUM CHLORIDE, SODIUM LACTATE AND CALCIUM CHLORIDE 9 ML/HR: 600; 310; 30; 20 INJECTION, SOLUTION INTRAVENOUS at 11:54

## 2022-05-04 RX ADMIN — ALBUMIN HUMAN: 0.05 INJECTION, SOLUTION INTRAVENOUS at 14:45

## 2022-05-04 RX ADMIN — EPHEDRINE SULFATE 5 MG: 50 INJECTION INTRAVENOUS at 14:30

## 2022-05-04 RX ADMIN — FENTANYL CITRATE 50 MCG: 50 INJECTION, SOLUTION INTRAMUSCULAR; INTRAVENOUS at 16:18

## 2022-05-04 RX ADMIN — ROCURONIUM BROMIDE 40 MG: 10 INJECTION, SOLUTION INTRAVENOUS at 13:50

## 2022-05-04 NOTE — PLAN OF CARE
Goal Outcome Evaluation:Arrival to floor, from PACU, @1745.  Oxygen at 2 litres/min NC remains till anesthesia abates.  Tolerating PO fluids.  Valdes to be removed @ 0600 on 5/5.

## 2022-05-04 NOTE — ANESTHESIA PROCEDURE NOTES
TAP block      Patient reassessed immediately prior to procedure    Patient location during procedure: OR  Start time: 5/4/2022 1:53 PM  Reason for block: at surgeon's request and post-op pain management  Performed by  CRNA/CAA: Volodymyr Randolph CRNA  Preanesthetic Checklist  Completed: patient identified, IV checked, site marked, risks and benefits discussed, surgical consent, monitors and equipment checked, pre-op evaluation and timeout performed  Prep:  Pt Position: supine  Sterile barriers:cap, gloves, sterile barriers and mask  Prep: ChloraPrep  Patient monitoring: blood pressure monitoring, continuous pulse oximetry and EKG  Procedure    Sedation: yes  Performed under: general  Guidance:ultrasound guided  Images:still images obtained, printed/placed on chart    Laterality:Bilateral  Block Type:TAP  Injection Technique:single-shot  Needle Type:short-bevel and echogenic  Needle Gauge:20 G  Resistance on Injection: none    Medications Used: dexamethasone sodium phosphate injection, 4 mg  bupivacaine PF (MARCAINE) 0.25 % injection, 50 mL  Med administered at 5/4/2022 1:57 PM      Medications  Comment:Block Injection:  LA dose divided between Right and Left block        Post Assessment  Injection Assessment: negative aspiration for heme, incremental injection and no paresthesia on injection  Patient Tolerance:comfortable throughout block  Complications:no  Additional Notes      Under Ultrasound guidance, a BBraun 4inch 360 degree needle was advanced with Normal Saline hydro dissection of tissue.  The Internal Oblique and Transversus Abdominus muscles where visualized.  At or before the aponeurosis of Internal Oblique, local anesthetic spread was visualized in the Transversus Abdominus Plane. Injection was made incrementally with aspiration every 5 mls.  There was no  intravascular injection,  injection pressure was normal, there was no neural injection, and the procedure was completed without difficulty.  Thank  You.

## 2022-05-04 NOTE — ANESTHESIA PROCEDURE NOTES
Airway  Urgency: elective    Date/Time: 5/4/2022 1:52 PM  Airway not difficult    General Information and Staff    Patient location during procedure: OR  CRNA/CAA: Quyen Cook CRNA    Indications and Patient Condition  Indications for airway management: airway protection    Preoxygenated: yes  MILS maintained throughout  Mask difficulty assessment: 1 - vent by mask    Final Airway Details  Final airway type: endotracheal airway      Successful airway: ETT    Successful intubation technique: direct laryngoscopy  Facilitating devices/methods: intubating stylet  Endotracheal tube insertion site: oral  Blade: Debora  Blade size: 3  ETT size (mm): 7.0  Cormack-Lehane Classification: grade I - full view of glottis  Placement verified by: chest auscultation and capnometry   Measured from: lips  ETT/EBT  to lips (cm): 21  Number of attempts at approach: 1  Assessment: lips, teeth, and gum same as pre-op and atraumatic intubation

## 2022-05-04 NOTE — OP NOTE
GYN Operative Note    Patient Name, age and sex:  Tamara Richardson, 46 y.o., female  Procedure Date:  5/4/2022    Surgeon(s) and Role:     * Juani Michaud MD - Primary    Additional Staff:  Maeve Laughlin  Medically necessary for assistance.Complex retraction. Suturing skills.Complex and critical patient postioning. Open abdominal surgery and retraction.    * No Diagnosis Codes entered *    * No Diagnosis Codes entered *    Procedure(s):  SUPRACERVICAL ABDOMINAL HYSTERECTOMY WITH BILATERAL SALPINGECTOMY    Indications for Operation: Large uterine fibroids.  Abnormal uterine bleeding.  Anemia.    Antibiotics:  cefazolin (Ancef) ordered on call to OR    Anesthesia:  Type: General -   ASA:  II    Operative Findings: 14-week size fibroid uterus.  Normal ovaries which did look mildly atrophic though.  Normal fallopian tubes that were removed with the supracervical uterus.  Cervix was left in place.  There is no other abdominal abnormalities.    Specimens Removed:    Specimens     ID Source Type Tests Collected By Collected At Frozen?    A Uterus with Fallopian Tubes Tissue · TISSUE PATHOLOGY EXAM   Juani Michaud MD 5/4/22 1518 No    This specimen was not marked as sent.          Estimated Blood Loss: 100 mL    Urine Output: 150 mL    Complications: None    Procedure Narrative: Patient was taken the operating room where patient was prepped and draped in the normal sterile fashion in the supine position with a Valdes catheter in place.  A low transverse skin incision was then made with a scalpel and carried to the underlying layer fascia with the scalpel.  The fascia was then incised in the midline extended laterally with the Hassan scissors.  The rectus muscles were then  midline peritoneum tented up and entered sharply.  This was extended superiorly and inferiorly with good visualization of the bladder.  The O'En-O'Barba retractor was placed bowel packed in the upper abdomen and  bladder blade placed.  The round ligaments were then clamped with a curved Mignon clamp transected suture ligated with 0 Vicryl suture.  The bladder flap was then created with the Metzenbaum scissors.  The utero-ovarian pedicle was then isolated and clamped with a curved Mignon clamp when an avascular window plane was made through the peritoneum.  This was transected and suture ligated with 0 Vicryl suture x2.  The right and left side done likewise.  After the utero-ovarian pedicles were clamped the uterine vessels were skeletonized bladder was dissected below the level of the cervix with the Metzenbaums.  Then the uterine vessels were clamped with curved Mignon clamp they were transected and suture ligated with 0 Vicryl suture.  Then a straight Mignon clamp was used to make sure all the vessels were hemostatic it was transected and suture ligated with 0 Vicryl suture.  At this level the uterus was transected off the cervix with the scalpel.  The os over the remaining cervix was then cauterized with the Bovie cautery to limit the amount of bleeding that would come from the cervix.  Then the cervix bed was closed with interrupted figure-of-eight 0 Vicryl sutures.  Hemostasis was confirmed.  The fallopian tubes were then removed from the ovaries.  The Rodrigo clamp was used to clamp the blood supply and separate the tube from the ovary it was transected with the Bovie and suture ligated with 3-0 Vicryl suture.  This was done on the right and left side as well.  The ovaries themselves appeared normal and had good blood supply.  I tacked each ovary to its ipsilateral round ligament for support and to limit risk of torsion in the future.  Then inspection for bleeding was noted.  There was some oozing in the left side of the cervical stump interrupted figure-of-eight sutures were placed there and hemostasis was confirmed under no tension.  Then some powder hemostatic agent was then sprayed in the peritoneal dissections  for hemostasis.  The self-retaining retractor was removed and lap sponges were removed.  A initial count was correct.  The peritoneum was then closed once all instruments were accounted for.  Closed with a 3-0 running Vicryl suture.  Rectus muscles reapproximated in the midline figure-of-eight 0 Vicryl sutures.  The fascia was closed in the midline with 0 Vicryl running sutures.  Subcuticular skin reapproximated interrupted 3-0 Vicryl and subcuticular 3 oh and closed with superficial 4-0 Monocryl suture and Dermabond.  Excellent hemostasis was noted.  Final count was correct.  She is awoken general anesthesia taken to recovery in stable condition.    Juani Michaud MD - 5/4/2022, 16:00 EDT

## 2022-05-04 NOTE — H&P
Patient Care Team:  Lilly Nicholas MD as PCP - General (Family Medicine)  Elizabeth Francisco MD as Consulting Physician (Obstetrics and Gynecology)    Chief complaint uterine fibroids heavy bleeding and anemia.    Subjective     Patient is a 46 y.o. female presents with large uterine fibroids with menorrhagia and anemia.  Also some bulk symptoms for which she would desire supracervical hysterectomy.  Patient desires to keep her cervix.  Understands that she may have some cyclic bleeding from her cervix.  Also wants to keep her ovaries.    Review of Systems   Pertinent items are noted in HPI    History  Past Medical History:   Diagnosis Date   • B12 deficiency    • Breast cyst     Left side   • HPV (human papilloma virus) infection    • Hyperlipidemia      Past Surgical History:   Procedure Laterality Date   • AUGMENTATION MAMMAPLASTY Bilateral     2010   • MYOMECTOMY     • REFRACTIVE SURGERY Bilateral    • WISDOM TOOTH EXTRACTION       Family History   Problem Relation Age of Onset   • Atrial fibrillation Mother    • Hyperlipidemia Mother    • Cancer Father         kidney   • Hyperlipidemia Brother    • Allergy (severe) Daughter         treenut   • Arthritis Maternal Grandmother    • Heart disease Other    • Stroke Other    • Breast cancer Neg Hx    • Ovarian cancer Neg Hx    • Diabetes Neg Hx    • Colon cancer Neg Hx      Social History     Tobacco Use   • Smoking status: Never Smoker   • Smokeless tobacco: Never Used   Vaping Use   • Vaping Use: Never used   Substance Use Topics   • Alcohol use: Yes     Comment: social   • Drug use: No     E-cigarette/Vaping   • E-cigarette/Vaping Use Never User      E-cigarette/Vaping Substances     Medications Prior to Admission   Medication Sig Dispense Refill Last Dose   • ferrous sulfate 325 (65 FE) MG tablet Take 325 mg by mouth Daily.   5/3/2022 at 1900   • Multiple Vitamins-Minerals (MULTIVITAMIN WITH MINERALS) tablet Take 1 tablet by mouth Daily.   5/3/2022  at 1900     Allergies:  No Known Allergies    Objective     Vital Signs  Temp:  [98.2 °F (36.8 °C)] 98.2 °F (36.8 °C)  Heart Rate:  [86] 86  Resp:  [16] 16  BP: (150)/(94) 150/94    Physical Exam:      General Appearance:    Alert, cooperative, in no acute distress   Head:    Normocephalic, without obvious abnormality, atraumatic   Eyes:            Lids and lashes normal, conjunctivae and sclerae normal, no   icterus, no pallor, corneas clear, PERRLA   Ears:    Ears appear intact with no abnormalities noted   Throat:   No oral lesions, no thrush, oral mucosa moist   Neck:   No adenopathy, supple, trachea midline, no thyromegaly, no     carotid bruit, no JVD   Back:     No kyphosis present, no scoliosis present, no skin lesions,       erythema or scars, no tenderness to percussion or                   palpation,   range of motion normal   Lungs:     Clear to auscultation,respirations regular, even and                   unlabored    Heart:    Regular rhythm and normal rate, normal S1 and S2, no            murmur, no gallop, no rub, no click   Breast Exam:    Deferred   Abdomen:     Normal bowel sounds, no masses, no organomegaly, soft        non-tender, non-distended, no guarding, no rebound                 tenderness   Genitalia:    Deferred   Extremities:   Moves all extremities well, no edema, no cyanosis, no              redness   Pulses:   Pulses palpable and equal bilaterally   Skin:   No bleeding, bruising or rash   Lymph nodes:   No palpable adenopathy   Neurologic:   Cranial nerves 2 - 12 grossly intact, sensation intact, DTR        present and equal bilaterally       Results Review:    I reviewed the patient's new clinical results.    Assessment/Plan       * No active hospital problems. *      Plan for abdominal supracervical hysterectomy.  Patient's already been given all postop medications and instructions.  Plan to leave the ovaries and cervix.

## 2022-05-04 NOTE — ANESTHESIA PREPROCEDURE EVALUATION
Anesthesia Evaluation     Patient summary reviewed and Nursing notes reviewed   no history of anesthetic complications:  NPO Solid Status: > 8 hours  NPO Liquid Status: > 2 hours           Airway   Mallampati: I  TM distance: >3 FB  Neck ROM: full  No difficulty expected  Dental - normal exam     Pulmonary     breath sounds clear to auscultation  Cardiovascular   Exercise tolerance: good (4-7 METS)    Rhythm: regular  Rate: normal    (+) hyperlipidemia,       Neuro/Psych  GI/Hepatic/Renal/Endo      Musculoskeletal     Abdominal   (-) obese    Abdomen: soft.   Substance History      OB/GYN          Other   arthritis,                      Anesthesia Plan    ASA 2     general     intravenous induction     Anesthetic plan, all risks, benefits, and alternatives have been provided, discussed and informed consent has been obtained with: patient.    Plan discussed with CRNA.        CODE STATUS:

## 2022-05-04 NOTE — ANESTHESIA POSTPROCEDURE EVALUATION
Patient: Tamara Richardson    Procedure Summary     Date: 05/04/22 Room / Location:  [facility] [address] [place] [address] [facility]    Anesthesia Start: 1344 Anesthesia Stop:     Procedure: SUPRACERVICAL ABDOMINAL HYSTERECTOMY WITH BILATERAL SALPINGECTOMY (N/A Abdomen) Diagnosis:     Surgeons: Juani Michaud MD Provider: Volodymyr Gandhi MD    Anesthesia Type: general ASA Status: 2          Anesthesia Type: general    Vitals  T 98.0  /68  HR 67  SpO2 100% on 2L NC  RR 10        Post Anesthesia Care and Evaluation    Patient location during evaluation: PACU  Patient participation: waiting for patient participation  Level of consciousness: responsive to physical stimuli  Pain score: 0  Pain management: adequate  Airway patency: patent  Anesthetic complications: No anesthetic complications  PONV Status: none  Cardiovascular status: hemodynamically stable and acceptable  Respiratory status: nonlabored ventilation, acceptable, nasal cannula and oral airway  Hydration status: acceptable    Comments: VSS. RN at bedside.

## 2022-05-05 LAB
ANION GAP SERPL CALCULATED.3IONS-SCNC: 9 MMOL/L (ref 5–15)
BUN SERPL-MCNC: 5 MG/DL (ref 6–20)
BUN/CREAT SERPL: 8.6 (ref 7–25)
CALCIUM SPEC-SCNC: 8.9 MG/DL (ref 8.6–10.5)
CHLORIDE SERPL-SCNC: 97 MMOL/L (ref 98–107)
CO2 SERPL-SCNC: 26 MMOL/L (ref 22–29)
CREAT SERPL-MCNC: 0.58 MG/DL (ref 0.57–1)
DEPRECATED RDW RBC AUTO: 41.1 FL (ref 37–54)
EGFRCR SERPLBLD CKD-EPI 2021: 112.5 ML/MIN/1.73
ERYTHROCYTE [DISTWIDTH] IN BLOOD BY AUTOMATED COUNT: 15 % (ref 12.3–15.4)
GLUCOSE SERPL-MCNC: 138 MG/DL (ref 65–99)
HCT VFR BLD AUTO: 26.7 % (ref 34–46.6)
HGB BLD-MCNC: 8.8 G/DL (ref 12–15.9)
MCH RBC QN AUTO: 25.3 PG (ref 26.6–33)
MCHC RBC AUTO-ENTMCNC: 33 G/DL (ref 31.5–35.7)
MCV RBC AUTO: 76.7 FL (ref 79–97)
PLATELET # BLD AUTO: 345 10*3/MM3 (ref 140–450)
PMV BLD AUTO: 10.7 FL (ref 6–12)
POTASSIUM SERPL-SCNC: 3.6 MMOL/L (ref 3.5–5.2)
RBC # BLD AUTO: 3.48 10*6/MM3 (ref 3.77–5.28)
SODIUM SERPL-SCNC: 132 MMOL/L (ref 136–145)
WBC NRBC COR # BLD: 8.79 10*3/MM3 (ref 3.4–10.8)

## 2022-05-05 PROCEDURE — 25010000002 CEFAZOLIN IN DEXTROSE 2-4 GM/100ML-% SOLUTION: Performed by: OBSTETRICS & GYNECOLOGY

## 2022-05-05 PROCEDURE — 25010000002 HEPARIN (PORCINE) PER 1000 UNITS: Performed by: OBSTETRICS & GYNECOLOGY

## 2022-05-05 PROCEDURE — 85027 COMPLETE CBC AUTOMATED: CPT | Performed by: OBSTETRICS & GYNECOLOGY

## 2022-05-05 PROCEDURE — 80048 BASIC METABOLIC PNL TOTAL CA: CPT | Performed by: OBSTETRICS & GYNECOLOGY

## 2022-05-05 RX ORDER — OXYCODONE AND ACETAMINOPHEN 10; 325 MG/1; MG/1
1 TABLET ORAL EVERY 4 HOURS PRN
Status: DISCONTINUED | OUTPATIENT
Start: 2022-05-05 | End: 2022-05-06 | Stop reason: HOSPADM

## 2022-05-05 RX ORDER — IBUPROFEN 600 MG/1
600 TABLET ORAL EVERY 6 HOURS SCHEDULED
Status: DISCONTINUED | OUTPATIENT
Start: 2022-05-05 | End: 2022-05-06 | Stop reason: HOSPADM

## 2022-05-05 RX ADMIN — TEMAZEPAM 15 MG: 15 CAPSULE ORAL at 01:04

## 2022-05-05 RX ADMIN — IBUPROFEN 600 MG: 600 TABLET ORAL at 17:23

## 2022-05-05 RX ADMIN — POLYETHYLENE GLYCOL 3350 17 G: 17 POWDER, FOR SOLUTION ORAL at 08:33

## 2022-05-05 RX ADMIN — HYDROCODONE BITARTRATE AND ACETAMINOPHEN 1 TABLET: 7.5; 325 TABLET ORAL at 08:18

## 2022-05-05 RX ADMIN — HYDROCODONE BITARTRATE AND ACETAMINOPHEN 1 TABLET: 7.5; 325 TABLET ORAL at 03:07

## 2022-05-05 RX ADMIN — IBUPROFEN 600 MG: 600 TABLET ORAL at 11:11

## 2022-05-05 RX ADMIN — OXYCODONE HYDROCHLORIDE AND ACETAMINOPHEN 1 TABLET: 10; 325 TABLET ORAL at 12:29

## 2022-05-05 RX ADMIN — HEPARIN SODIUM 5000 UNITS: 5000 INJECTION, SOLUTION INTRAVENOUS; SUBCUTANEOUS at 08:18

## 2022-05-05 RX ADMIN — CEFAZOLIN SODIUM 2 G: 2 INJECTION, SOLUTION INTRAVENOUS at 05:50

## 2022-05-05 RX ADMIN — IBUPROFEN 600 MG: 600 TABLET ORAL at 23:05

## 2022-05-05 RX ADMIN — OXYCODONE HYDROCHLORIDE AND ACETAMINOPHEN 1 TABLET: 10; 325 TABLET ORAL at 20:38

## 2022-05-05 RX ADMIN — HEPARIN SODIUM 5000 UNITS: 5000 INJECTION, SOLUTION INTRAVENOUS; SUBCUTANEOUS at 20:39

## 2022-05-05 RX ADMIN — OXYCODONE HYDROCHLORIDE AND ACETAMINOPHEN 1 TABLET: 10; 325 TABLET ORAL at 16:36

## 2022-05-05 NOTE — PLAN OF CARE
Goal Outcome Evaluation:  Plan of Care Reviewed With: patient           Outcome Evaluation: Patient given multiple PRNs for pain through the night. Maintained low level of pain due to this. Incision site clean dry and intact with abd in place. Remains of 2 L NC. Instruction given on incentive spirometer and patient tolerated well. Valdes to be removed at 0600.

## 2022-05-05 NOTE — CASE MANAGEMENT/SOCIAL WORK
Discharge Planning Assessment  [facility]     Patient Name: Tamara Richardson  MRN: [id number]  Today's Date: 5/5/2022    Admit Date: 5/4/2022     Discharge Needs Assessment     Row Name 05/05/22 1542       Living Environment    People in Home child(sage), dependent;spouse    Current Living Arrangements home    Primary Care Provided by self    Provides Primary Care For child(sage)    Family Caregiver if Needed spouse    Quality of Family Relationships supportive       Resource/Environmental Concerns    Resource/Environmental Concerns none       Transition Planning    Patient/Family Anticipates Transition to home    Patient/Family Anticipated Services at Transition none    Transportation Anticipated family or friend will provide       Discharge Needs Assessment    Readmission Within the Last 30 Days no previous admission in last 30 days    Equipment Currently Used at Home none    Concerns to be Addressed no discharge needs identified    Anticipated Changes Related to Illness none    Equipment Needed After Discharge none               Discharge Plan     Row Name 05/05/22 1543       Plan    Plan cm/ MSW to follow    Plan Comments Spoke with pt. Has [employer] insurance. Independent with ADLS. currently does not have HH or DME. Deneis any d/c planning needs. Plan is home at d/c.    Final Discharge Disposition Code 01 - home or self-care              Continued Care and Services - Admitted Since 5/4/2022    Coordination has not been started for this encounter.          Demographic Summary     Row Name 05/05/22 1541       General Information    Admission Type inpatient    Referral Source admission list    Reason for Consult discharge planning               Functional Status     Row Name 05/05/22 1542       Functional Status    Usual Activity Tolerance excellent    Current Activity Tolerance excellent       Functional Status, IADL    Medications independent    Meal Preparation independent    Housekeeping  independent    Laundry independent    Shopping independent       Mental Status    General Appearance WDL WDL       Mental Status Summary    Recent Changes in Mental Status/Cognitive Functioning no changes               Psychosocial    No documentation.                Abuse/Neglect    No documentation.                Legal    No documentation.                Substance Abuse    No documentation.                Patient Forms    No documentation.                   Tanesha Palacios, MSW

## 2022-05-05 NOTE — PROGRESS NOTES
Surgery Post-op Note  [place]    * No Diagnosis Codes entered *    * No Diagnosis Codes entered *    Procedure(s):  SUPRACERVICAL ABDOMINAL HYSTERECTOMY WITH BILATERAL SALPINGECTOMY    Subjective     Pain was really bad last night.  She had a really hard time getting up and ambulating.  No n/v , no bleeding, lehman just out but not up yet to ambulate      Objective   Physical Exam  Vitals:    05/05/22 0700   BP: 120/78   Pulse: 74   Resp: 18   Temp: 98.1 °F (36.7 °C)   SpO2: 96%     General: awake, alert, comfortable    Pulm: CTAB    CVS: no M/R/G, reg rate    Abd: soft, NT, ND, NABS    Ext: warm, well perfused      Labs:  Lab Results (last 24 hours)     Procedure Component Value Units Date/Time    CBC (No Diff) [[id number]]  (Abnormal) Collected: 05/05/22 0658    Specimen: Blood Updated: 05/05/22 0749     WBC 8.79 10*3/mm3      RBC 3.48 10*6/mm3      Hemoglobin 8.8 g/dL      Hematocrit 26.7 %      MCV 76.7 fL      MCH 25.3 pg      MCHC 33.0 g/dL      RDW 15.0 %      RDW-SD 41.1 fl      MPV 10.7 fL      Platelets 345 10*3/mm3     Basic Metabolic Panel [[id number]] Collected: 05/05/22 0658    Specimen: Blood Updated: 05/05/22 0732    Tissue Pathology Exam [[id number]] Collected: 05/04/22 1518    Specimen: Tissue from Uterus with Fallopian Tubes Updated: 05/05/22 0659    POC Pregnancy, Urine [[id number]]  (Normal) Collected: 05/04/22 1224    Specimen: Urine Updated: 05/04/22 1225     HCG, Urine, QL Negative     Lot Number [id number]     Internal Positive Control Passed     Internal Negative Control Passed     Expiration Date 4/2,023              Intake and Output:    Intake/Output Summary (Last 24 hours) at 5/5/2022 0812  Last data filed at 5/5/2022 0300  Gross per 24 hour   Intake 1840 ml   Output 2500 ml   Net -660 ml       Assessment     The patient is a 46 y.o. female 1 Day Post-Op after abdominal supracervical hysterectomy and bilateral salpingectomy    Plan     · Try better pain management by using ibuprofen tid and  norco inbetween  ·  ambulate with PT help today  · Postop findings discussed  · Possible home tomorrow.   · Advance care  ·  may shower, advance diet as tolerated.     Juani Michaud MD  05/05/22  08:12 EDT

## 2022-05-05 NOTE — PLAN OF CARE
Goal Outcome Evaluation:   VSS. Incision C/D/I, abdominal binder in place. Complains of moderate pain, PRN given. Voiding. Ambulating in halls. PRN Miralax given. Tolerating diet. Monitoring.        Progress: no change

## 2022-05-06 ENCOUNTER — READMISSION MANAGEMENT (OUTPATIENT)
Dept: CALL CENTER | Facility: HOSPITAL | Age: 47
End: 2022-05-06

## 2022-05-06 VITALS
RESPIRATION RATE: 18 BRPM | WEIGHT: 125 LBS | BODY MASS INDEX: 21.34 KG/M2 | DIASTOLIC BLOOD PRESSURE: 73 MMHG | HEART RATE: 98 BPM | SYSTOLIC BLOOD PRESSURE: 112 MMHG | HEIGHT: 64 IN | TEMPERATURE: 98 F | OXYGEN SATURATION: 100 %

## 2022-05-06 PROCEDURE — 25010000002 HEPARIN (PORCINE) PER 1000 UNITS: Performed by: OBSTETRICS & GYNECOLOGY

## 2022-05-06 RX ADMIN — OXYCODONE HYDROCHLORIDE AND ACETAMINOPHEN 1 TABLET: 10; 325 TABLET ORAL at 06:25

## 2022-05-06 RX ADMIN — HEPARIN SODIUM 5000 UNITS: 5000 INJECTION, SOLUTION INTRAVENOUS; SUBCUTANEOUS at 08:59

## 2022-05-06 RX ADMIN — OXYCODONE HYDROCHLORIDE AND ACETAMINOPHEN 1 TABLET: 10; 325 TABLET ORAL at 00:58

## 2022-05-06 RX ADMIN — IBUPROFEN 600 MG: 600 TABLET ORAL at 05:21

## 2022-05-06 NOTE — DISCHARGE SUMMARY
[facility] GYN Discharge Note    Date of Discharge:  5/6/2022    Discharge Diagnosis:   Uterine fibroids    Problem List:    * No active hospital problems. *      Presenting Problem/History of Present Illness  S/P abdominal hysterectomy [Z90.710]        Hospital Course  Patient is a 46 y.o. female presented with uterine fibroids and heavy bleeding.  She presented for surgical management - following surgery she was admitted to the floor.  Her pain is well managed, she is tolerating regular diet, she is ambulating.  She is ready for dc home.      Procedures Performed  Procedure(s):  SUPRACERVICAL ABDOMINAL HYSTERECTOMY WITH BILATERAL SALPINGECTOMY       Consults:   Consults     No orders found from 4/5/2022 to 5/5/2022.          Pertinent Test Results: surgical pathology pending    Condition on Discharge:  good    Vital Signs  Temp:  [98 °F (36.7 °C)-98.8 °F (37.1 °C)] 98 °F (36.7 °C)  Heart Rate:  [68-98] 98  Resp:  [18] 18  BP: (105-120)/(66-82) 112/73    Discharge Disposition      Discharge Medications     Discharge Medications      ASK your doctor about these medications      Instructions Start Date   ferrous sulfate 325 (65 FE) MG tablet   325 mg, Oral, Daily      multivitamin with minerals tablet tablet  Generic drug: multivitamin with minerals   1 tablet, Oral, Daily             Discharge Diet   regular    Activity at Discharge  Pelvic rest  No lifting >10 lbs  Rest    Follow-up Appointments  No future appointments.      Test Results Pending at Discharge  Pending Labs     Order Current Status    Tissue Pathology Exam In process          Time: morning

## 2022-05-06 NOTE — PROGRESS NOTES
[facility] GYN Post-Op Note    Subjective     Subjective:  Patient did well overnight.  Her pain is well controlled with oral pain meds.  She is not having nausea.  She is not having emesis.  She is tolerating po well.  Intake normal.  No BM yet, having some flatus.  She is voiding well.  She is ambulating.   She is using her incentive spirometer.      Objective     Physical Exam    Vital signs current     Intake/Output last 24 hours:      Intake/Output Summary (Last 24 hours) at 5/6/2022 0900  Last data filed at 5/6/2022 0700  Gross per 24 hour   Intake 120 ml   Output 950 ml   Net -830 ml     Intake/Output this shift:  No intake/output data recorded.    Labs:   Lab Results (most recent)     Procedure Component Value Units Date/Time    Basic Metabolic Panel [[id number]]  (Abnormal) Collected: 05/05/22 0658    Specimen: Blood Updated: 05/05/22 0814     Glucose 138 mg/dL      BUN 5 mg/dL      Creatinine 0.58 mg/dL      Sodium 132 mmol/L      Potassium 3.6 mmol/L      Chloride 97 mmol/L      CO2 26.0 mmol/L      Calcium 8.9 mg/dL      BUN/Creatinine Ratio 8.6     Anion Gap 9.0 mmol/L      eGFR 112.5 mL/min/1.73      Comment: National Kidney Foundation and American Society of Nephrology (ASN) Task Force recommended calculation based on the Chronic Kidney Disease Epidemiology Collaboration (CKD-EPI) equation refit without adjustment for race.       Narrative:      GFR Normal >60  Chronic Kidney Disease <60  Kidney Failure <15      CBC (No Diff) [[id number]]  (Abnormal) Collected: 05/05/22 0658    Specimen: Blood Updated: 05/05/22 0749     WBC 8.79 10*3/mm3      RBC 3.48 10*6/mm3      Hemoglobin 8.8 g/dL      Hematocrit 26.7 %      MCV 76.7 fL      MCH 25.3 pg      MCHC 33.0 g/dL      RDW 15.0 %      RDW-SD 41.1 fl      MPV 10.7 fL      Platelets 345 10*3/mm3     Tissue Pathology Exam [[id number]] Collected: 05/04/22 1518    Specimen: Tissue from Uterus with Fallopian Tubes Updated: 05/05/22 0659    POC Pregnancy,  Urine [[id number]]  (Normal) Collected: 05/04/22 1224    Specimen: Urine Updated: 05/04/22 1225     HCG, Urine, QL Negative     Lot Number [id number]     Internal Positive Control Passed     Internal Negative Control Passed     Expiration Date 4/2,023        Labs/Studies reviewed:  Yes   Radiology:  Imaging Results (Last 24 Hours)     ** No results found for the last 24 hours. **        Radiology studies reviewed:   No   Medications:   Current Facility-Administered Medications   Medication Dose Route Frequency Provider Last Rate Last Admin   • heparin (porcine) 5000 UNIT/ML injection 5,000 Units  5,000 Units Subcutaneous Q12H Juani Michaud MD   5,000 Units at 05/06/22 0859   • ibuprofen (ADVIL,MOTRIN) tablet 600 mg  600 mg Oral Q6H Juani Michaud MD   600 mg at 05/06/22 0521   • lactated ringers infusion  9 mL/hr Intravenous Continuous PRN Juani Michaud MD   Stopped at 05/05/22 0729   • morphine injection 1 mg  1 mg Intravenous Q4H PRN Juani Michaud MD        And   • naloxone (NARCAN) injection 0.4 mg  0.4 mg Intravenous Q5 Min PRN Juani Michaud MD       • ondansetron (ZOFRAN) tablet 4 mg  4 mg Oral Q6H PRN Juani Michaud MD        Or   • ondansetron (ZOFRAN) injection 4 mg  4 mg Intravenous Q6H PRN Jauni Michaud MD       • oxyCODONE-acetaminophen (PERCOCET)  MG per tablet 1 tablet  1 tablet Oral Q4H PRN Juani Michaud MD   1 tablet at 05/06/22 0625   • polyethylene glycol (MIRALAX) packet 17 g  17 g Oral Daily PRN Juani Michaud MD   17 g at 05/05/22 0833   • simethicone (MYLICON) chewable tablet 80 mg  80 mg Oral 4x Daily PRN Juani Michaud MD       • temazepam (RESTORIL) capsule 15 mg  15 mg Oral Nightly PRN Juani Michaud MD   15 mg at 05/05/22 0104     Medications reviewed:   Yes     GENERAL: Alert, well-appearing  non  obese female in no apparent distress.    CARDIOVASCULAR: Normal rate,  regular rhythm, no murmurs, rubs, or gallops.    RESPIRATORY: Clear to auscultation bilaterally, normal respiratory effort  GASTROINTESTINAL:  Soft, appropriately tender, non-distended, no rebound or guarding.  Positive bowel sounds.  Incision healing well - no bruising, no separation, glue intact  GENITOURINARY: Valdes previously removed.    SKIN:  Warm, dry, well-perfused.    PSYCHIATRIC: AO x3, with appropriate affect, normal thought processes  EXREMITIES: Symmetric.  No peripheral edema.    Assessment/Plan     Plan for dc home today    All questions answered.    Post op day 2 Procedure(s):  SUPRACERVICAL ABDOMINAL HYSTERECTOMY WITH BILATERAL SALPINGECTOMY doing well.    Plan:  DC home today   No lifting >10lbs  Pelvic rest  Pathology pending  Has home meds  Has postop FU appt scheduled            NICOLE Maravilla  05/06/22  09:00 EDT

## 2022-05-06 NOTE — PAYOR COMM NOTE
"Joann Bella RN CM  Phone [phone]  Fax [phone]    Tamara Richardson (46 y.o. Female)             Date of Birth   1975    Social Security Number       Address   [address] [facility] [address]    Home Phone   [phone]    MRN   [id number]       Adventism   Adventism    Marital Status                               Admission Date   5/4/22    Admission Type   Elective    Admitting Provider   Juani Michaud MD    Attending Provider       Department, Room/Bed   [address], [address]/1       Discharge Date   5/6/2022    Discharge Disposition   Home or Self Care    Discharge Destination                               Attending Provider: (none)   Allergies: No Known Allergies    Isolation: None   Infection: None   Code Status: CPR   Advance Care Planning Activity    Ht: 162.6 cm (64\")   Wt: 56.7 kg (125 lb)    Admission Cmt: None   Principal Problem: None                Active Insurance as of 5/4/2022     Primary Coverage     Payor Plan Insurance Group Employer/Plan Group    [facility] BLUE CROSS Located within [facility] EMPLOYEE [id number]     Payor Plan Address Payor Plan Phone Number Payor Plan Fax Number Effective Dates    [address] [phone]  1/1/2022 - None Entered    [facility] [id number]       Subscriber Name Subscriber Birth Date Member ID       TAMARA RICHARDSON 1975 [id number]                 Emergency Contacts      (Rel.) Home Phone Work Phone Mobile Phone    Jeff Richardson (Spouse) -- -- [phone]               Discharge Summary      Pallavi Mccallum PA at 05/06/22 0904          [facility] GYN Discharge Note    Date of Discharge:  5/6/2022    Discharge Diagnosis:   Uterine fibroids    Problem List:    * No active hospital problems. *      Presenting Problem/History of Present Illness  S/P abdominal hysterectomy [Z90.710]        Hospital Course  Patient is a 46 y.o. female presented with uterine fibroids and " heavy bleeding.  She presented for surgical management - following surgery she was admitted to the floor.  Her pain is well managed, she is tolerating regular diet, she is ambulating.  She is ready for dc home.      Procedures Performed  Procedure(s):  SUPRACERVICAL ABDOMINAL HYSTERECTOMY WITH BILATERAL SALPINGECTOMY       Consults:   Consults     No orders found from 4/5/2022 to 5/5/2022.          Pertinent Test Results: surgical pathology pending    Condition on Discharge:  good    Vital Signs  Temp:  [98 °F (36.7 °C)-98.8 °F (37.1 °C)] 98 °F (36.7 °C)  Heart Rate:  [68-98] 98  Resp:  [18] 18  BP: (105-120)/(66-82) 112/73    Discharge Disposition      Discharge Medications     Discharge Medications      ASK your doctor about these medications      Instructions Start Date   ferrous sulfate 325 (65 FE) MG tablet   325 mg, Oral, Daily      multivitamin with minerals tablet tablet  Generic drug: multivitamin with minerals   1 tablet, Oral, Daily             Discharge Diet   regular    Activity at Discharge  Pelvic rest  No lifting >10 lbs  Rest    Follow-up Appointments  No future appointments.      Test Results Pending at Discharge  Pending Labs     Order Current Status    Tissue Pathology Exam In process          Time: morning    Electronically signed by Pallavi Mccallum PA at 05/06/22 0972

## 2022-05-06 NOTE — OUTREACH NOTE
Prep Survey    Flowsheet Row Responses   [facility] facility patient discharged from? [place]   Is LACE score < 7 ? Yes   Emergency Room discharge w/ pulse ox? No   Eligibility [facility]   Date of Admission 05/04/22   Date of Discharge 05/06/22   Discharge Disposition Home or Self Care   Discharge diagnosis TOTAL ABDOMINAL HYSTERECTOMY   Does the patient have one of the following disease processes/diagnoses(primary or secondary)? General Surgery   Does the patient have Home health ordered? No   Is there a DME ordered? No   Prep survey completed? Yes          ARNULFO HORTON - Registered Nurse

## 2022-05-06 NOTE — PLAN OF CARE
Goal Outcome Evaluation:  Plan of Care Reviewed With: patient        Progress: no change  Outcome Evaluation: Patient called for PRN pain medication at scheduled times through the night. Patient remains extremely anxious. Tolerating food and water by mouth with no nausea. Voiding. Incision clean dry and intact. Patient removed IV this morning.

## 2022-05-09 ENCOUNTER — TRANSITIONAL CARE MANAGEMENT TELEPHONE ENCOUNTER (OUTPATIENT)
Dept: CALL CENTER | Facility: HOSPITAL | Age: 47
End: 2022-05-09

## 2022-05-09 NOTE — OUTREACH NOTE
Call Center TCM Note    Flowsheet Row Responses   List of hospitals in [place] patient discharged from? [facility] [facility]   Does the patient have one of the following disease processes/diagnoses(primary or secondary)? General Surgery   TCM attempt successful? Yes   Call start time 1446   Call end time 1452   Discharge diagnosis TOTAL ABDOMINAL HYSTERECTOMY   Meds reviewed with patient/caregiver? Yes   Is the patient having any side effects they believe may be caused by any medication additions or changes? No   Does the patient have all medications related to this admission filled (includes all antibiotics, pain medications, etc.) Yes   Is the patient taking all medications as directed (includes completed medication regime)? Yes   Does the patient have a follow up appointment scheduled with their surgeon? Yes  [5/13/22]   Has the patient kept scheduled appointments due by today? N/A   Comments Pt is going to call PCP and schedule the hospital d/c f/u   Has home health visited the patient within 72 hours of discharge? N/A   Psychosocial issues? No   Did the patient receive a copy of their discharge instructions? Yes   Nursing interventions Reviewed instructions with patient   What is the patient's perception of their health status since discharge? Improving   Nursing interventions Nurse provided patient education   Is the patient /caregiver able to teach back basic post-op care? Continue use of incentive spirometry at least 1 week post discharge, Drive as instructed by MD in discharge instructions, Take showers only when approved by MD-sponge bathe until then, No tub bath, swimming, or hot tub until instructed by MD, Keep incision areas clean,dry and protected, Lifting as instructed by MD in discharge instructions   Is the patient/caregiver able to teach back signs and symptoms of incisional infection? Increased redness, swelling or pain at the incisonal site, Increased drainage or bleeding, Fever, Pus or odor from incision   Is the  patient/caregiver able to teach back steps to recovery at home? Set small, achievable goals for return to baseline health, Rest and rebuild strength, gradually increase activity, Eat a well-balance diet   If the patient is a current smoker, are they able to teach back resources for cessation? Not a smoker   Is the patient/caregiver able to teach back the hierarchy of who to call/visit for symptoms/problems? PCP, Specialist, Home health nurse, Urgent Care, ED, 911 Yes   TCM call completed? Yes          JESE LOPES RN    5/9/2022, 14:53 EDT

## 2022-06-17 ENCOUNTER — TRANSCRIBE ORDERS (OUTPATIENT)
Dept: ADMINISTRATIVE | Facility: HOSPITAL | Age: 47
End: 2022-06-17

## 2022-06-17 DIAGNOSIS — Z12.31 ENCOUNTER FOR SCREENING MAMMOGRAM FOR MALIGNANT NEOPLASM OF BREAST: Primary | ICD-10-CM

## 2022-07-13 ENCOUNTER — HOSPITAL ENCOUNTER (OUTPATIENT)
Dept: MAMMOGRAPHY | Facility: HOSPITAL | Age: 47
Discharge: HOME | End: 2022-07-13
Admitting: OBSTETRICS & GYNECOLOGY

## 2022-07-13 DIAGNOSIS — Z12.31 ENCOUNTER FOR SCREENING MAMMOGRAM FOR MALIGNANT NEOPLASM OF BREAST: ICD-10-CM

## 2022-07-13 PROCEDURE — 77067 SCR MAMMO BI INCL CAD: CPT

## 2022-07-13 PROCEDURE — 77063 BREAST TOMOSYNTHESIS BI: CPT

## 2022-07-13 PROCEDURE — 77063 BREAST TOMOSYNTHESIS BI: CPT | Performed by: RADIOLOGY

## 2022-07-13 PROCEDURE — 77067 SCR MAMMO BI INCL CAD: CPT | Performed by: RADIOLOGY

## 2022-10-20 ENCOUNTER — TELEPHONE (OUTPATIENT)
Dept: FAMILY MEDICINE CLINIC | Facility: CLINIC | Age: 47
End: 2022-10-20

## 2022-10-20 DIAGNOSIS — Z12.11 SCREENING FOR MALIGNANT NEOPLASM OF COLON: Primary | ICD-10-CM

## 2022-10-20 NOTE — TELEPHONE ENCOUNTER
Caller: Tamara Richardson    Relationship: Self    Best call back number: [phone]    What is the best time to reach you: ANY    Who are you requesting to speak with (clinical staff, provider,  specific staff member): DR. HIGH    Do you know the name of the person who called: SELF    What was the call regarding: PATIENT WANTED TO DISCUSS COLONOSCOPY.    Do you require a callback: YES

## 2022-10-20 NOTE — TELEPHONE ENCOUNTER
Contacted pt to get more information. Pt states she is wanting to complete her screening before the year is out. Pt is requesting an order, I asked pt if she preferred cologuard or colonoscopy. Pt states she wasn't sure if colguard would be an option for her age and wanted PCP to verify if so she prefers the cologuard.

## 2022-10-20 NOTE — TELEPHONE ENCOUNTER
Colon cancer screening is recommended to start at age 45 with a average risk.  Most insurances cover the Cologuard kit and I have ordered it to be shipped to your house.  You can also contact your insurance company directly to see if Cologuard is a covered test.

## 2022-11-01 ENCOUNTER — OFFICE VISIT (OUTPATIENT)
Dept: FAMILY MEDICINE CLINIC | Facility: CLINIC | Age: 47
End: 2022-11-01

## 2022-11-01 VITALS
HEIGHT: 64 IN | WEIGHT: 133.4 LBS | HEART RATE: 89 BPM | DIASTOLIC BLOOD PRESSURE: 80 MMHG | SYSTOLIC BLOOD PRESSURE: 120 MMHG | BODY MASS INDEX: 22.77 KG/M2 | OXYGEN SATURATION: 99 %

## 2022-11-01 DIAGNOSIS — R63.5 WEIGHT GAIN: ICD-10-CM

## 2022-11-01 DIAGNOSIS — Z12.11 SCREENING FOR MALIGNANT NEOPLASM OF COLON: ICD-10-CM

## 2022-11-01 DIAGNOSIS — Z00.00 WELL ADULT EXAM: Primary | ICD-10-CM

## 2022-11-01 DIAGNOSIS — H61.23 BILATERAL IMPACTED CERUMEN: ICD-10-CM

## 2022-11-01 DIAGNOSIS — Z78.0 MENOPAUSE: ICD-10-CM

## 2022-11-01 PROBLEM — N92.0 MENORRHAGIA WITH REGULAR CYCLE: Status: RESOLVED | Noted: 2020-10-23 | Resolved: 2022-11-01

## 2022-11-01 PROBLEM — D25.1 INTRAMURAL LEIOMYOMA OF UTERUS: Status: RESOLVED | Noted: 2020-10-23 | Resolved: 2022-11-01

## 2022-11-01 PROCEDURE — 99396 PREV VISIT EST AGE 40-64: CPT | Performed by: FAMILY MEDICINE

## 2022-11-01 NOTE — PROGRESS NOTES
"Chief Complaint  Well adult exam and Annual Exam    Subjective          Tamara Richardson presents to [facility] for   History of Present Illness    No FH colon cancer.     She had hysterectomy this year with fibroids and anemia. Her stomach is swollen. She is in pelvic PT. She has blood tests through her OB/GYN. She has gained weight and concerned about menopause. Hot flashes and sweating worse with stress.     Right index finger splinter.     She has a cyst on her upper back asymptomatic.         PHQ-2/PHQ-9 Depression Screening 11/1/2022   Retired Total Score -   Little Interest or Pleasure in Doing Things 0-->not at all   Feeling Down, Depressed or Hopeless 0-->not at all   PHQ-9: Brief Depression Severity Measure Score 0       Objective   Vital Signs:   Vitals:    11/01/22 1400   BP: 120/80   Pulse: 89   SpO2: 99%   Weight: 60.5 kg (133 lb 6.4 oz)   Height: 162.6 cm (64.02\")     Body mass index is 22.89 kg/m².    BMI is within normal parameters. No other follow-up for BMI required.          Physical Exam  Constitutional:       General: She is not in acute distress.  HENT:      Right Ear: There is impacted cerumen.      Left Ear: There is impacted cerumen.   Eyes:      General:         Right eye: No discharge.         Left eye: No discharge.      Conjunctiva/sclera: Conjunctivae normal.   Neck:      Thyroid: No thyromegaly.   Cardiovascular:      Rate and Rhythm: Normal rate and regular rhythm.   Pulmonary:      Effort: Pulmonary effort is normal.      Breath sounds: Normal breath sounds.   Abdominal:      General: There is distension ( lower abdomen).      Palpations: Abdomen is soft. There is no hepatomegaly.      Tenderness: There is no abdominal tenderness. There is no guarding or rebound.   Musculoskeletal:      Cervical back: Neck supple.        Back:       Right lower leg: No edema.      Left lower leg: No edema.   Lymphadenopathy:      Head:      Right side of head: No " submandibular, preauricular or posterior auricular adenopathy.      Left side of head: No submandibular, preauricular or posterior auricular adenopathy.      Cervical: No cervical adenopathy.   Skin:     General: Skin is warm.      Comments: Distal finger pad right index finger subcutaneous brown foreign body   Neurological:      Mental Status: She is alert and oriented to person, place, and time.   Psychiatric:         Mood and Affect: Mood normal.         Behavior: Behavior normal.         Thought Content: Thought content normal.         Judgment: Judgment normal.        Result Review :{ Labs  Result Review  Imaging  Med Tab  Media :23}                Immunization History   Administered Date(s) Administered   • COVID-19 (PFIZER) PURPLE CAP 01/23/2021, 02/17/2021   • Flu Vaccine Quad PF >36MO 11/20/2020       Health Maintenance   Topic Date Due   • TDAP/TD VACCINES (1 - Tdap) Never done   • PAP SMEAR  01/01/2019   • LIPID PANEL  02/26/2022   • INFLUENZA VACCINE  08/01/2022            Assessment and Plan    Diagnoses and all orders for this visit:    1. Well adult exam (Primary)  -     CBC (No Diff); Future  -     Comprehensive Metabolic Panel; Future  -     TSH Rfx On Abnormal To Free T4; Future  -     Lipid Panel; Future  Return when fasting for labs.  Mammogram screening up-to-date.  Continue well woman care with OB/GYN.  2. Screening for malignant neoplasm of colon  -     Ambulatory Referral For Screening Colonoscopy  She would like to have her colonoscopy before the end of the year if possible.  She also has a Cologuard kit.  Advised to complete 1 or the other.  3. Weight gain  -     FSH & LH; Future  -     Estradiol; Future    4. Menopause  -     FSH & LH; Future  -     Estradiol; Future  Check labs.  5. Bilateral impacted cerumen  She plans on contacting ENT      Counseling/anticipatory guidance: Nutrition, physical activity, screenings      Follow Up   Return in about 1 year (around 11/1/2023) for  Physical and fasting labs.  Patient was given instructions and counseling regarding her condition or for health maintenance advice. Please see specific information pulled into the AVS if appropriate.   Electronically signed by Lilly Esquivel MD, 11/01/22, 2:42 PM EDT.

## 2022-11-10 DIAGNOSIS — Z12.11 SCREENING FOR COLON CANCER: Primary | ICD-10-CM

## 2022-11-23 ENCOUNTER — OUTSIDE FACILITY SERVICE (OUTPATIENT)
Dept: GASTROENTEROLOGY | Facility: CLINIC | Age: 47
End: 2022-11-23

## 2022-11-23 PROCEDURE — 45385 COLONOSCOPY W/LESION REMOVAL: CPT | Performed by: INTERNAL MEDICINE

## 2022-11-23 PROCEDURE — 88305 TISSUE EXAM BY PATHOLOGIST: CPT

## 2022-11-25 ENCOUNTER — LAB REQUISITION (OUTPATIENT)
Dept: LAB | Facility: HOSPITAL | Age: 47
End: 2022-11-25
Payer: COMMERCIAL

## 2022-11-25 DIAGNOSIS — D12.5 BENIGN NEOPLASM OF SIGMOID COLON: ICD-10-CM

## 2022-11-25 DIAGNOSIS — K64.8 OTHER HEMORRHOIDS: ICD-10-CM

## 2022-11-25 DIAGNOSIS — Z12.11 ENCOUNTER FOR SCREENING FOR MALIGNANT NEOPLASM OF COLON: ICD-10-CM

## 2022-11-28 LAB — REF LAB TEST METHOD: NORMAL

## 2023-10-25 ENCOUNTER — LAB (OUTPATIENT)
Dept: LAB | Facility: HOSPITAL | Age: 48
End: 2023-10-25
Payer: COMMERCIAL

## 2023-10-25 DIAGNOSIS — Z00.00 WELL ADULT EXAM: ICD-10-CM

## 2023-10-25 DIAGNOSIS — R63.5 WEIGHT GAIN: ICD-10-CM

## 2023-10-25 DIAGNOSIS — Z78.0 MENOPAUSE: ICD-10-CM

## 2023-10-25 LAB
ALBUMIN SERPL-MCNC: 4.9 G/DL (ref 3.5–5.2)
ALBUMIN/GLOB SERPL: 2 G/DL
ALP SERPL-CCNC: 66 U/L (ref 39–117)
ALT SERPL W P-5'-P-CCNC: 12 U/L (ref 1–33)
ANION GAP SERPL CALCULATED.3IONS-SCNC: 12.6 MMOL/L (ref 5–15)
AST SERPL-CCNC: 18 U/L (ref 1–32)
BILIRUB SERPL-MCNC: 0.5 MG/DL (ref 0–1.2)
BUN SERPL-MCNC: 10 MG/DL (ref 6–20)
BUN/CREAT SERPL: 15.9 (ref 7–25)
CALCIUM SPEC-SCNC: 10 MG/DL (ref 8.6–10.5)
CHLORIDE SERPL-SCNC: 102 MMOL/L (ref 98–107)
CHOLEST SERPL-MCNC: 239 MG/DL (ref 0–200)
CO2 SERPL-SCNC: 23.4 MMOL/L (ref 22–29)
CREAT SERPL-MCNC: 0.63 MG/DL (ref 0.57–1)
DEPRECATED RDW RBC AUTO: 37.3 FL (ref 37–54)
EGFRCR SERPLBLD CKD-EPI 2021: 108.9 ML/MIN/1.73
ERYTHROCYTE [DISTWIDTH] IN BLOOD BY AUTOMATED COUNT: 11.7 % (ref 12.3–15.4)
ESTRADIOL SERPL HS-MCNC: 46.9 PG/ML
FSH SERPL-ACNC: 19.6 MIU/ML
GLOBULIN UR ELPH-MCNC: 2.4 GM/DL
GLUCOSE SERPL-MCNC: 81 MG/DL (ref 65–99)
HCT VFR BLD AUTO: 42.5 % (ref 34–46.6)
HDLC SERPL-MCNC: 104 MG/DL (ref 40–60)
HGB BLD-MCNC: 14.3 G/DL (ref 12–15.9)
LDLC SERPL CALC-MCNC: 125 MG/DL (ref 0–100)
LDLC/HDLC SERPL: 1.18 {RATIO}
LH SERPL-ACNC: 27.2 MIU/ML
MCH RBC QN AUTO: 30 PG (ref 26.6–33)
MCHC RBC AUTO-ENTMCNC: 33.6 G/DL (ref 31.5–35.7)
MCV RBC AUTO: 89.3 FL (ref 79–97)
PLATELET # BLD AUTO: 304 10*3/MM3 (ref 140–450)
PMV BLD AUTO: 11.1 FL (ref 6–12)
POTASSIUM SERPL-SCNC: 3.9 MMOL/L (ref 3.5–5.2)
PROT SERPL-MCNC: 7.3 G/DL (ref 6–8.5)
RBC # BLD AUTO: 4.76 10*6/MM3 (ref 3.77–5.28)
SODIUM SERPL-SCNC: 138 MMOL/L (ref 136–145)
TRIGL SERPL-MCNC: 59 MG/DL (ref 0–150)
TSH SERPL DL<=0.05 MIU/L-ACNC: 1.08 UIU/ML (ref 0.27–4.2)
VLDLC SERPL-MCNC: 10 MG/DL (ref 5–40)
WBC NRBC COR # BLD: 4.93 10*3/MM3 (ref 3.4–10.8)

## 2023-10-25 PROCEDURE — 80061 LIPID PANEL: CPT

## 2023-10-25 PROCEDURE — 80050 GENERAL HEALTH PANEL: CPT

## 2023-10-25 PROCEDURE — 83002 ASSAY OF GONADOTROPIN (LH): CPT

## 2023-10-25 PROCEDURE — 82670 ASSAY OF TOTAL ESTRADIOL: CPT

## 2023-10-25 PROCEDURE — 83001 ASSAY OF GONADOTROPIN (FSH): CPT

## 2023-11-07 ENCOUNTER — OFFICE VISIT (OUTPATIENT)
Age: 48
End: 2023-11-07
Payer: COMMERCIAL

## 2023-11-07 VITALS
BODY MASS INDEX: 22.24 KG/M2 | OXYGEN SATURATION: 99 % | SYSTOLIC BLOOD PRESSURE: 120 MMHG | DIASTOLIC BLOOD PRESSURE: 72 MMHG | HEIGHT: 64 IN | WEIGHT: 130.3 LBS | HEART RATE: 82 BPM

## 2023-11-07 DIAGNOSIS — Z00.00 WELL ADULT EXAM: Primary | ICD-10-CM

## 2023-11-07 DIAGNOSIS — E78.00 PURE HYPERCHOLESTEROLEMIA: ICD-10-CM

## 2023-11-07 PROCEDURE — 99396 PREV VISIT EST AGE 40-64: CPT | Performed by: FAMILY MEDICINE

## 2023-11-07 NOTE — PROGRESS NOTES
"Chief Complaint  Well adult exam (Go over lab results /Cold feet ) and Annual Exam    Subjective          Tamara Richardson presents to [facility] for   History of Present Illness    Goal 125 lbs. She stopped eating at night while watching tv. Pretty good diet, but it could be better. Not in excess. She has 2 gym memberships, cycle bar stopped with a family problem and has [employer].     She feels tired a lot. Emotional fine. No sleep problems, night sweats or hot flashes.     She doesn't like the cold. No color change when she has cold sensation. When she takes a hot bath her feet turn purple.           11/7/2023     8:56 AM   PHQ-2/PHQ-9 Depression Screening   Little Interest or Pleasure in Doing Things 0-->not at all   Feeling Down, Depressed or Hopeless 0-->not at all   PHQ-9: Brief Depression Severity Measure Score 0       Objective   Vital Signs:   Vitals:    11/07/23 0854   BP: 120/72   Pulse: 82   SpO2: 99%   Weight: 59.1 kg (130 lb 4.8 oz)   Height: 162.6 cm (64.02\")     Body mass index is 22.35 kg/m².    BMI is within normal parameters. No other follow-up for BMI required.          Physical Exam  Constitutional:       General: She is not in acute distress.  HENT:      Right Ear: There is impacted cerumen.      Left Ear: There is impacted cerumen.      Nose: No congestion or rhinorrhea.      Mouth/Throat:      Mouth: Mucous membranes are moist.      Pharynx: No oropharyngeal exudate or posterior oropharyngeal erythema.   Eyes:      General:         Right eye: No discharge.         Left eye: No discharge.      Conjunctiva/sclera: Conjunctivae normal.   Neck:      Thyroid: No thyromegaly.   Cardiovascular:      Rate and Rhythm: Normal rate and regular rhythm.      Pulses:           Dorsalis pedis pulses are 2+ on the right side and 2+ on the left side.   Pulmonary:      Effort: Pulmonary effort is normal.      Breath sounds: Normal breath sounds.   Abdominal:      Palpations: " Abdomen is soft. There is no hepatomegaly.      Tenderness: There is no abdominal tenderness.   Musculoskeletal:      Cervical back: Neck supple.      Right lower leg: No edema.      Left lower leg: No edema.   Feet:      Right foot:      Skin integrity: Skin integrity normal.      Left foot:      Skin integrity: Skin integrity normal.   Lymphadenopathy:      Head:      Right side of head: No submandibular, preauricular or posterior auricular adenopathy.      Left side of head: No submandibular, preauricular or posterior auricular adenopathy.      Cervical: No cervical adenopathy.   Skin:     General: Skin is warm.   Neurological:      Mental Status: She is alert and oriented to person, place, and time.   Psychiatric:         Mood and Affect: Mood normal.         Behavior: Behavior normal.         Thought Content: Thought content normal.         Judgment: Judgment normal.        Result Review :   The following data was reviewed by: Lilly Esquivel MD on 11/07/2023:  Common labs          10/25/2023    09:26   Common Labs   Glucose 81    BUN 10    Creatinine 0.63    Sodium 138    Potassium 3.9    Chloride 102    Calcium 10.0    Albumin 4.9    Total Bilirubin 0.5    Alkaline Phosphatase 66    AST (SGOT) 18    ALT (SGPT) 12    WBC 4.93    Hemoglobin 14.3    Hematocrit 42.5    Platelets 304    Total Cholesterol 239    Triglycerides 59    HDL Cholesterol 104    LDL Cholesterol  125      TSH          10/25/2023    09:26   TSH   TSH 1.080             FSH & LH (10/25/2023 09:26)  Estradiol (10/25/2023 09:26)    Immunization History   Administered Date(s) Administered    COVID-19 (PFIZER) Purple Cap Monovalent 01/23/2021, 02/17/2021    Flu Vaccine Quad PF >36MO 11/20/2020       Health Maintenance   Topic Date Due    HEPATITIS A VACCINE ADULT (1 of 2) Never done    TDAP/TD VACCINES (1 - Tdap) Never done    HEPATITIS C SCREENING  Never done    PAP SMEAR  01/01/2019    COVID-19 Vaccine (3 - 2023-24 season) 09/01/2023    ANNUAL  PHYSICAL  11/01/2023    INFLUENZA VACCINE  03/31/2024 (Originally 8/1/2023)    LIPID PANEL  10/25/2024    COLORECTAL CANCER SCREENING  11/23/2032    Pneumococcal Vaccine 0-64  Aged Out            Assessment and Plan    Diagnoses and all orders for this visit:    1. Well adult exam (Primary)  Reviewed previsit labs with patient in detail.  Breast cancer screening mammogram up-to-date.  Colon cancer screening up-to-date.  Continue well woman care with OB/GYN.  Recommend influenza and tetanus vaccine but she deferred.  Continue vitamin supplementation.  2. Pure hypercholesterolemia  No need for statin at this time. Continue healthy lifestyle through diet and exercise.  Recheck yearly.      Counseling/anticipatory guidance: healthy weight, dental health,  immunizations, screenings      Follow Up   Return in about 1 year (around 11/8/2024) for Physical and fasting labs.  Patient was given instructions and counseling regarding her condition or for health maintenance advice. Please see specific information pulled into the AVS if appropriate.     Electronically signed by Lilly Esquivel MD, 11/07/23, 9:38 AM EST.

## 2024-05-22 ENCOUNTER — TRANSCRIBE ORDERS (OUTPATIENT)
Dept: ADMINISTRATIVE | Facility: HOSPITAL | Age: 49
End: 2024-05-22
Payer: COMMERCIAL

## 2024-05-22 DIAGNOSIS — Z12.31 VISIT FOR SCREENING MAMMOGRAM: Primary | ICD-10-CM

## 2024-07-19 LAB
NCCN CRITERIA FLAG: NORMAL
TYRER CUZICK SCORE: 8.5

## 2024-07-22 ENCOUNTER — HOSPITAL ENCOUNTER (OUTPATIENT)
Dept: MAMMOGRAPHY | Facility: HOSPITAL | Age: 49
Discharge: HOME | End: 2024-07-22
Admitting: OBSTETRICS & GYNECOLOGY
Payer: COMMERCIAL

## 2024-07-22 DIAGNOSIS — Z12.31 VISIT FOR SCREENING MAMMOGRAM: ICD-10-CM

## 2024-07-22 PROCEDURE — 77063 BREAST TOMOSYNTHESIS BI: CPT

## 2024-07-22 PROCEDURE — 77067 SCR MAMMO BI INCL CAD: CPT

## 2024-07-23 PROCEDURE — 77067 SCR MAMMO BI INCL CAD: CPT | Performed by: RADIOLOGY

## 2024-07-23 PROCEDURE — 77063 BREAST TOMOSYNTHESIS BI: CPT | Performed by: RADIOLOGY

## 2025-01-14 ENCOUNTER — OFFICE VISIT (OUTPATIENT)
Age: 50
End: 2025-01-14
Payer: COMMERCIAL

## 2025-01-14 VITALS
BODY MASS INDEX: 22.79 KG/M2 | HEART RATE: 84 BPM | SYSTOLIC BLOOD PRESSURE: 118 MMHG | OXYGEN SATURATION: 97 % | WEIGHT: 133.5 LBS | DIASTOLIC BLOOD PRESSURE: 72 MMHG | HEIGHT: 64 IN

## 2025-01-14 DIAGNOSIS — E53.8 B12 DEFICIENCY: ICD-10-CM

## 2025-01-14 DIAGNOSIS — L72.0 EPIDERMOID CYST OF SKIN OF BACK: ICD-10-CM

## 2025-01-14 DIAGNOSIS — E55.9 VITAMIN D DEFICIENCY: ICD-10-CM

## 2025-01-14 DIAGNOSIS — Z00.00 WELL ADULT EXAM: Primary | ICD-10-CM

## 2025-01-14 DIAGNOSIS — E78.00 PURE HYPERCHOLESTEROLEMIA: ICD-10-CM

## 2025-01-14 PROCEDURE — 99396 PREV VISIT EST AGE 40-64: CPT | Performed by: FAMILY MEDICINE

## 2025-01-14 NOTE — PROGRESS NOTES
Chief Complaint  Annual Exam    Subjective              Tamara Richardson presents to [facility] for   History of Present Illness  History of Present Illness  The patient is a 49-year-old female presenting for an annual physical exam.    She has been experiencing a sensation of heaviness in her breast. She notes a recent diagnosis of breast cancer in a close friend. She has not observed any changes in her breasts but reports occasional awareness of her saline implant in one breast.  She denies breast pain.  She has not noticed any axillary lymphadenopathy.  She is considering seeking consultation with her OB-GYN regarding this issue.    She expresses concern about a cyst on her back, which she is unable to monitor visually. She questions the need for a CT scan to further evaluate the cyst. Additionally, she reports discomfort when placing a barbell on her back during body pump classes due to the presence of the cyst.    She reports experiencing cold feet, which she manages by sleeping with a heated blanket and using hand warmers in her socks at home. She also notes that her feet turn purple during baths.     She receives well-woman care at [facility] and had a mammogram performed in the summer. She is interested in having blood work done as part of her physical examination. She has expressed interest in assessing her vitamin B12 and D levels due to previous deficiencies. She takes a multivitamin intermittently and uses an emergency packet containing vitamin C when feeling unwell. She declines the influenza vaccine and tetanus booster at this time but is considering the shingles vaccine. She adheres to a four-food diet and maintains an active lifestyle, including walking during the summer and attending spin classes at the [facility]. She plans to increase her physical activity to three times a week in the new year.    MEDICATIONS  Multivitamin    Objective   Vital Signs:   Vitals:     "01/14/25 1442   BP: 118/72   BP Location: Left arm   Patient Position: Sitting   Cuff Size: Adult   Pulse: 84   SpO2: 97%   Weight: 60.6 kg (133 lb 8 oz)   Height: 162.6 cm (64\")     Body mass index is 22.92 kg/m².    BMI is within normal parameters. No other follow-up for BMI required.        The following portions of the patient's history were reviewed and updated as appropriate: allergies, current medications, past family history, past medical history, past social history, past surgical history, and problem list.      Physical Exam  Exam conducted with a chaperone present.   Constitutional:       General: She is not in acute distress.  HENT:      Right Ear: Tympanic membrane and ear canal normal.      Left Ear: Tympanic membrane and ear canal normal.      Nose: No congestion or rhinorrhea.      Mouth/Throat:      Mouth: Mucous membranes are moist.      Pharynx: No oropharyngeal exudate or posterior oropharyngeal erythema.   Eyes:      General:         Right eye: No discharge.         Left eye: No discharge.      Conjunctiva/sclera: Conjunctivae normal.   Neck:      Thyroid: No thyromegaly.   Cardiovascular:      Rate and Rhythm: Normal rate and regular rhythm.      Pulses:           Dorsalis pedis pulses are 2+ on the right side and 2+ on the left side.        Posterior tibial pulses are 2+ on the right side and 2+ on the left side.   Pulmonary:      Effort: Pulmonary effort is normal.      Breath sounds: Normal breath sounds.   Chest:   Breasts:     Right: No skin change or tenderness.      Left: No skin change or tenderness.   Abdominal:      Palpations: Abdomen is soft. There is no hepatomegaly.      Tenderness: There is no abdominal tenderness.   Musculoskeletal:      Cervical back: Neck supple.        Back:    Feet:      Right foot:      Skin integrity: Skin integrity normal.      Left foot:      Skin integrity: Skin integrity normal.      Comments: Feet cool to the touch.  Lymphadenopathy:      Head:      " Right side of head: No submandibular, preauricular or posterior auricular adenopathy.      Left side of head: No submandibular, preauricular or posterior auricular adenopathy.      Cervical: No cervical adenopathy.      Upper Body:      Right upper body: No axillary adenopathy.      Left upper body: No axillary adenopathy.   Skin:     General: Skin is warm.   Neurological:      Mental Status: She is alert and oriented to person, place, and time.   Psychiatric:         Mood and Affect: Mood normal.         Behavior: Behavior normal.         Thought Content: Thought content normal.         Judgment: Judgment normal.        Result Review :                Immunization History   Administered Date(s) Administered    COVID-19 (PFIZER) Purple Cap Monovalent 01/23/2021, 02/17/2021    Flu Vaccine Quad PF >36MO 11/20/2020    Fluzone (or Fluarix & Flulaval for VFC) >6mos 12/06/2020       Health Maintenance   Topic Date Due    HEPATITIS A VACCINE ADULT (1 of 2) Never done    TDAP/TD VACCINES (1 - Tdap) Never done    HEPATITIS C SCREENING  Never done    PAP SMEAR  01/01/2019    ZOSTER VACCINE (1 of 2) Never done    INFLUENZA VACCINE  07/01/2024    COVID-19 Vaccine (3 - 2024-25 season) 09/01/2024    LIPID PANEL  10/25/2024    ANNUAL PHYSICAL  11/07/2024    MAMMOGRAM  07/22/2025    COLORECTAL CANCER SCREENING  11/23/2032    Pneumococcal Vaccine 0-64  Aged Out            Assessment and Plan    Diagnoses and all orders for this visit:    1. Well adult exam (Primary)  -     Lipid Panel; Future  -     Comprehensive Metabolic Panel; Future  -     CBC (No Diff); Future  -     TSH Rfx On Abnormal To Free T4; Future  -     Vitamin B12; Future    2. Pure hypercholesterolemia    3. B12 deficiency  -     Vitamin B12; Future    4. Vitamin D deficiency  -     Vitamin D,25-Hydroxy; Future    5. Epidermoid cyst of skin of back        Assessment & Plan  1. Annual physical examination.  A comprehensive metabolic panel, complete blood count, lipid  panel, and thyroid function tests will be ordered. Additionally, vitamin B12 and D levels will be assessed. She has been advised to undergo these tests within the next month. The importance of influenza, tetanus, and shingles vaccines was discussed, but she declined the influenza and tetanus vaccines at this time. She is considering the shingles vaccine.  She is up-to-date on colon cancer screening colonoscopy.    2. Breast heaviness.  Clinical breast exam today is normal.  She had a normal mammogram in July.  She is considering consulting with OB/GYN..    3. Back cyst.  The cyst was previously measured at 4 cm in November 2022 and is again the same size.  She has been informed that cysts are typically benign but can occasionally become inflamed, necessitating drainage or removal.  Other possibility is a lipoma.  If she is interested in removal I can place a referral to general surgeon.    4. Cold feet.  Her feet have normal pulses.  Recommend continuing behavior modification.  No further workup is needed at this time.        Counseling/anticipatory guidance: Nutrition, physical activity,  immunizations, screenings      Follow Up   Return in about 1 year (around 1/15/2026) for Physical and fasting labs.  Patient was given instructions and counseling regarding her condition or for health maintenance advice. Please see specific information pulled into the AVS if appropriate.     Patient or patient representative verbalized consent for the use of Ambient Listening during the visit with  Lilly Esquivel MD for chart documentation. 1/14/2025  15:19 EST     Electronically signed by Lilly Esquivel MD, 01/14/25, 3:19 PM EST.

## 2025-01-27 ENCOUNTER — TELEPHONE (OUTPATIENT)
Age: 50
End: 2025-01-27
Payer: COMMERCIAL

## 2025-01-27 DIAGNOSIS — Z00.00 WELL ADULT EXAM: Primary | ICD-10-CM

## 2025-01-27 NOTE — TELEPHONE ENCOUNTER
Caller: Tamaar Richardson    Relationship: Self    Best call back number: [phone]    What orders are you requesting (i.e. lab or imaging): IRON LAB     In what timeframe would the patient need to come in: ASAP    Where will you receive your lab/imaging services: OFFICE

## 2025-01-28 NOTE — TELEPHONE ENCOUNTER
Lvm   Relay    Iron test ordered.  She also has blood work that I ordered on 1/14/2025.  Please fast for 8 hours to have all the labs collected.

## 2025-01-28 NOTE — TELEPHONE ENCOUNTER
Iron test ordered.  She also has blood work that I ordered on 1/14/2025.  Please fast for 8 hours to have all the labs collected.

## 2025-01-28 NOTE — TELEPHONE ENCOUNTER
Name: Tamara Richardson      Relationship: Self      Best Callback Number:   Telephone Information:   Mobile [phone]       HUB PROVIDED THE RELAY MESSAGE FROM THE OFFICE      PATIENT: VOICED UNDERSTANDING AND HAS NO FURTHER QUESTIONS AT THIS TIME    ADDITIONAL INFORMATION:

## 2025-02-05 ENCOUNTER — LAB (OUTPATIENT)
Dept: LAB | Facility: HOSPITAL | Age: 50
End: 2025-02-05
Payer: COMMERCIAL

## 2025-02-05 DIAGNOSIS — E55.9 VITAMIN D DEFICIENCY: ICD-10-CM

## 2025-02-05 DIAGNOSIS — Z00.00 WELL ADULT EXAM: ICD-10-CM

## 2025-02-05 DIAGNOSIS — E53.8 B12 DEFICIENCY: ICD-10-CM

## 2025-02-05 LAB
25(OH)D3 SERPL-MCNC: 39.7 NG/ML (ref 30–100)
ALBUMIN SERPL-MCNC: 4.6 G/DL (ref 3.5–5.2)
ALBUMIN/GLOB SERPL: 1.6 G/DL
ALP SERPL-CCNC: 71 U/L (ref 39–117)
ALT SERPL W P-5'-P-CCNC: 14 U/L (ref 1–33)
ANION GAP SERPL CALCULATED.3IONS-SCNC: 10 MMOL/L (ref 5–15)
AST SERPL-CCNC: 20 U/L (ref 1–32)
BILIRUB SERPL-MCNC: 0.6 MG/DL (ref 0–1.2)
BUN SERPL-MCNC: 9 MG/DL (ref 6–20)
BUN/CREAT SERPL: 14.5 (ref 7–25)
CALCIUM SPEC-SCNC: 9.8 MG/DL (ref 8.6–10.5)
CHLORIDE SERPL-SCNC: 103 MMOL/L (ref 98–107)
CHOLEST SERPL-MCNC: 245 MG/DL (ref 0–200)
CO2 SERPL-SCNC: 23 MMOL/L (ref 22–29)
CREAT SERPL-MCNC: 0.62 MG/DL (ref 0.57–1)
DEPRECATED RDW RBC AUTO: 39.2 FL (ref 37–54)
EGFRCR SERPLBLD CKD-EPI 2021: 108.6 ML/MIN/1.73
ERYTHROCYTE [DISTWIDTH] IN BLOOD BY AUTOMATED COUNT: 11.7 % (ref 12.3–15.4)
GLOBULIN UR ELPH-MCNC: 2.9 GM/DL
GLUCOSE SERPL-MCNC: 95 MG/DL (ref 65–99)
HCT VFR BLD AUTO: 42.4 % (ref 34–46.6)
HDLC SERPL-MCNC: 111 MG/DL (ref 40–60)
HGB BLD-MCNC: 13.6 G/DL (ref 12–15.9)
IRON 24H UR-MRATE: 134 MCG/DL (ref 37–145)
IRON SATN MFR SERPL: 38 % (ref 20–50)
LDLC SERPL CALC-MCNC: 126 MG/DL (ref 0–100)
LDLC/HDLC SERPL: 1.12 {RATIO}
MCH RBC QN AUTO: 29.4 PG (ref 26.6–33)
MCHC RBC AUTO-ENTMCNC: 32.1 G/DL (ref 31.5–35.7)
MCV RBC AUTO: 91.6 FL (ref 79–97)
PLATELET # BLD AUTO: 343 10*3/MM3 (ref 140–450)
PMV BLD AUTO: 10.9 FL (ref 6–12)
POTASSIUM SERPL-SCNC: 4.2 MMOL/L (ref 3.5–5.2)
PROT SERPL-MCNC: 7.5 G/DL (ref 6–8.5)
RBC # BLD AUTO: 4.63 10*6/MM3 (ref 3.77–5.28)
SODIUM SERPL-SCNC: 136 MMOL/L (ref 136–145)
TIBC SERPL-MCNC: 356 MCG/DL (ref 298–536)
TRANSFERRIN SERPL-MCNC: 239 MG/DL (ref 200–360)
TRIGL SERPL-MCNC: 51 MG/DL (ref 0–150)
TSH SERPL DL<=0.05 MIU/L-ACNC: 1.28 UIU/ML (ref 0.27–4.2)
VIT B12 BLD-MCNC: 1081 PG/ML (ref 211–946)
VLDLC SERPL-MCNC: 8 MG/DL (ref 5–40)
WBC NRBC COR # BLD AUTO: 4.63 10*3/MM3 (ref 3.4–10.8)

## 2025-02-05 PROCEDURE — 80050 GENERAL HEALTH PANEL: CPT

## 2025-02-05 PROCEDURE — 84466 ASSAY OF TRANSFERRIN: CPT

## 2025-02-05 PROCEDURE — 83540 ASSAY OF IRON: CPT

## 2025-02-05 PROCEDURE — 82306 VITAMIN D 25 HYDROXY: CPT

## 2025-02-05 PROCEDURE — 82607 VITAMIN B-12: CPT

## 2025-02-05 PROCEDURE — 80061 LIPID PANEL: CPT

## 2025-06-19 ENCOUNTER — OFFICE VISIT (OUTPATIENT)
Age: 50
End: 2025-06-19
Payer: COMMERCIAL

## 2025-06-19 VITALS
BODY MASS INDEX: 22.94 KG/M2 | DIASTOLIC BLOOD PRESSURE: 74 MMHG | HEIGHT: 64 IN | SYSTOLIC BLOOD PRESSURE: 124 MMHG | TEMPERATURE: 98 F | HEART RATE: 76 BPM | WEIGHT: 134.38 LBS | OXYGEN SATURATION: 98 %

## 2025-06-19 DIAGNOSIS — R53.83 OTHER FATIGUE: ICD-10-CM

## 2025-06-19 DIAGNOSIS — L42 PITYRIASIS ROSEA: Primary | ICD-10-CM

## 2025-06-19 DIAGNOSIS — E78.00 PURE HYPERCHOLESTEROLEMIA: ICD-10-CM

## 2025-06-19 DIAGNOSIS — D17.1 LIPOMA OF TORSO: ICD-10-CM

## 2025-06-19 PROCEDURE — 99214 OFFICE O/P EST MOD 30 MIN: CPT | Performed by: FAMILY MEDICINE

## 2025-06-19 RX ORDER — TRIAMCINOLONE ACETONIDE 1 MG/G
1 CREAM TOPICAL 2 TIMES DAILY
COMMUNITY

## 2025-06-19 NOTE — PROGRESS NOTES
Chief Complaint  Rash and lab review     Subjective        Tamara Richardson presents to [facility]  History of Present Illness    History of Present Illness  The patient is a 50-year-old female presenting for evaluation of a rash and follow-up on lab results.    She has been diagnosed with pityriasis rosea by her dermatologist, who prescribed triamcinolone 0.1% cream. She has been applying the cream only to the most bothersome areas for the past week. The rash initially appeared as a few bumps on her breast following Mother's Day, accompanied by a sensation of heaviness in one breast and concerns about lymph nodes. By early June, the rash had spread extensively across her back and stomach, with the most severe manifestation on her breast. Despite reassurances from her healthcare providers that it was not indicative of inflammatory breast cancer, she sought a dermatological consultation due to the worsening condition. She is scheduled to visit the [place] next week and is seeking advice on whether sun exposure could exacerbate her condition.    She expresses concern about potential fatty liver disease, given that some of her friends have been diagnosed with this condition. She is curious about the baseline criteria for prescribing cholesterol medication and is considering retesting her cholesterol levels. She has been consuming a significant amount of beans recently and believes this may have contributed to a decrease in her cholesterol levels. She does not include oats in her diet.    She reports experiencing fatigue, which she believes may be related to her rash. She also mentions that she has been struggling with fatigue in general.    FAMILY HISTORY  Her mother has high cholesterol, a pacemaker, and has had strokes. Her father has high cholesterol.    Objective   Vital Signs:  /74 (BP Location: Left arm, Patient Position: Sitting, Cuff Size: Adult)   Pulse 76   Temp 98 °F  "(36.7 °C) (Temporal)   Ht 162.6 cm (64\")   Wt 61 kg (134 lb 6 oz)   SpO2 98%   BMI 23.07 kg/m²   Estimated body mass index is 23.07 kg/m² as calculated from the following:    Height as of this encounter: 162.6 cm (64\").    Weight as of this encounter: 61 kg (134 lb 6 oz).    BMI is within normal parameters. No other follow-up for BMI required.      The following portions of the patient's history were reviewed and updated as appropriate: allergies, current medications, past family history, past medical history, past social history, past surgical history, and problem list.       Physical Exam  Vitals reviewed.   Constitutional:       General: She is not in acute distress.     Appearance: She is not ill-appearing.   Cardiovascular:      Rate and Rhythm: Normal rate and regular rhythm.   Pulmonary:      Effort: Pulmonary effort is normal.      Breath sounds: Normal breath sounds.   Musculoskeletal:        Back:    Skin:     Comments: Erythematous papules on trunk   Neurological:      Mental Status: She is alert.   Psychiatric:         Mood and Affect: Mood normal.        Result Review :  The following data was reviewed by: Lilly Esquivel MD on 06/19/2025:  Common labs          2/5/2025    08:46   Common Labs   Glucose 95    BUN 9    Creatinine 0.62    Sodium 136    Potassium 4.2    Chloride 103    Calcium 9.8    Albumin 4.6    Total Bilirubin 0.6    Alkaline Phosphatase 71    AST (SGOT) 20    ALT (SGPT) 14    WBC 4.63    Hemoglobin 13.6    Hematocrit 42.4    Platelets 343    Total Cholesterol 245    Triglycerides 51    HDL Cholesterol 111    LDL Cholesterol  126      TSH          2/5/2025    08:46   TSH   TSH 1.280              Vitamin B12 (02/05/2025 08:46)  Vitamin D,25-Hydroxy (02/05/2025 08:46)  Iron Profile (02/05/2025 08:46)  Assessment and Plan   Diagnoses and all orders for this visit:    1. Pityriasis rosea (Primary)    2. Pure hypercholesterolemia    3. Lipoma of torso    4. Other fatigue         "     Assessment & Plan  1. Pityriasis Rosea.  - She has been using triamcinolone 0.1% cream on the affected areas.  - The rash is widespread, covering her back and lower back.  - She is advised to consult her dermatologist regarding the potential impact of sun exposure on her skin condition during her upcoming beach visit.    2. Elevated LDL Cholesterol.  - Her LDL cholesterol has increased from 111 to 126 over the past four years, while her total cholesterol remains stable at 245.  - A heart-healthy diet, including reducing red meat, fried foods, and incorporating healthy fats like olive oil, oats, and flaxseed, is recommended.  - She is advised to include more plant-based foods, beans, legumes, nuts, seeds, and omega-3-rich foods in her diet.  - Cholesterol medication will not be initiated at this time. A repeat cholesterol test will be conducted during her next physical examination.    3. Fatigue.  - Her thyroid function, iron level, vitamin D level, and B12 level are all within normal ranges.  - There is no lab work explanation for her fatigue.    4. Lipoma.  - The lipoma on her back measures 4 cm with no swelling, redness, drainage, or central pore.  - She is advised that if the lipoma bothers her, a consultation can be set up to discuss removal.  - If it is not causing any problems, she can wait until her next year's appointment for further evaluation.  - An ultrasound is not generally needed presurgery for lipoma evaluation.      Follow Up   Return if symptoms worsen or fail to improve.  Patient was given instructions and counseling regarding her condition or for health maintenance advice. Please see specific information pulled into the AVS if appropriate.         Patient or patient representative verbalized consent for the use of Ambient Listening during the visit with  Lilly Esquivel MD for chart documentation. 6/19/2025  12:31 EDT    Electronically signed by Lilly Esquivel MD, 06/19/25, 12:31 PM  EDT.

## (undated) DEVICE — GLV SURG SIGNATURE TOUCH PF LTX 7 STRL

## (undated) DEVICE — CYSTO/BLADDER IRRIGATION SET, REGULATING CLAMP

## (undated) DEVICE — APPL CHLORAPREP TINTED 26ML TEAL

## (undated) DEVICE — ABDOMINAL BINDER: Brand: DEROYAL

## (undated) DEVICE — TOTAL TRAY, 16FR 10ML SIL FOLEY, URN: Brand: MEDLINE

## (undated) DEVICE — LEX BASIC NO DRAPE: Brand: MEDLINE INDUSTRIES, INC.

## (undated) DEVICE — SUT VICYL PLS CTD ANTIB BR 1 27IN VIL

## (undated) DEVICE — ANTIBACTERIAL UNDYED BRAIDED (POLYGLACTIN 910), SYNTHETIC ABSORBABLE SUTURE: Brand: COATED VICRYL

## (undated) DEVICE — JELLY,LUBE,STERILE,FLIP TOP,TUBE,2-OZ: Brand: MEDLINE

## (undated) DEVICE — SUT VIC 0 TIES 18IN J912G

## (undated) DEVICE — ADHS SKIN PREMIERPRO EXOFIN TOPICAL HI/VISC .5ML

## (undated) DEVICE — SCRB SURG BACTOSHIELD CHG 4PCT 4OZ

## (undated) DEVICE — GLV SURG SENSICARE PI MIC PF SZ6.5 LF STRL